# Patient Record
Sex: FEMALE | Race: WHITE | URBAN - METROPOLITAN AREA
[De-identification: names, ages, dates, MRNs, and addresses within clinical notes are randomized per-mention and may not be internally consistent; named-entity substitution may affect disease eponyms.]

---

## 2017-11-13 DIAGNOSIS — N85.02 COMPLEX ENDOMETRIAL HYPERPLASIA WITH ATYPIA: Primary | ICD-10-CM

## 2017-11-27 RX ORDER — DIMENHYDRINATE 50 MG
1 TABLET ORAL DAILY
COMMUNITY

## 2017-11-27 RX ORDER — MULTIPLE VITAMINS W/ MINERALS TAB 9MG-400MCG
1 TAB ORAL DAILY
COMMUNITY

## 2017-11-28 ENCOUNTER — ANESTHESIA EVENT (OUTPATIENT)
Dept: SURGERY | Facility: CLINIC | Age: 57
End: 2017-11-28
Payer: COMMERCIAL

## 2017-11-29 ENCOUNTER — HOSPITAL ENCOUNTER (OUTPATIENT)
Facility: CLINIC | Age: 57
LOS: 1 days | Discharge: HOME | End: 2017-11-30
Attending: OBSTETRICS & GYNECOLOGY | Admitting: OBSTETRICS & GYNECOLOGY
Payer: COMMERCIAL

## 2017-11-29 ENCOUNTER — ANESTHESIA (OUTPATIENT)
Dept: SURGERY | Facility: CLINIC | Age: 57
End: 2017-11-29
Payer: COMMERCIAL

## 2017-11-29 ENCOUNTER — SURGERY (OUTPATIENT)
Age: 57
End: 2017-11-29
Payer: COMMERCIAL

## 2017-11-29 DIAGNOSIS — N85.02 COMPLEX ENDOMETRIAL HYPERPLASIA WITH ATYPIA: ICD-10-CM

## 2017-11-29 PROBLEM — Z90.710 S/P HYSTERECTOMY: Status: ACTIVE | Noted: 2017-11-29

## 2017-11-29 LAB
ABO + RH BLD: NORMAL
ABO + RH BLD: NORMAL
BLD GP AB SCN SERPL QL: NORMAL
BLOOD BANK CMNT PATIENT-IMP: NORMAL
GLUCOSE BLDC GLUCOMTR-MCNC: 100 MG/DL (ref 70–99)
HGB BLD-MCNC: 15.3 G/DL (ref 11.7–15.7)
POTASSIUM SERPL-SCNC: 3.9 MMOL/L (ref 3.4–5.3)
SPECIMEN EXP DATE BLD: NORMAL

## 2017-11-29 PROCEDURE — C9399 UNCLASSIFIED DRUGS OR BIOLOG: HCPCS | Performed by: NURSE ANESTHETIST, CERTIFIED REGISTERED

## 2017-11-29 PROCEDURE — 25000128 H RX IP 250 OP 636: Performed by: OBSTETRICS & GYNECOLOGY

## 2017-11-29 PROCEDURE — 40000171 ZZH STATISTIC PRE-PROCEDURE ASSESSMENT III: Performed by: OBSTETRICS & GYNECOLOGY

## 2017-11-29 PROCEDURE — 88342 IMHCHEM/IMCYTCHM 1ST ANTB: CPT | Performed by: OBSTETRICS & GYNECOLOGY

## 2017-11-29 PROCEDURE — 36415 COLL VENOUS BLD VENIPUNCTURE: CPT | Performed by: OBSTETRICS & GYNECOLOGY

## 2017-11-29 PROCEDURE — 27210794 ZZH OR GENERAL SUPPLY STERILE: Performed by: OBSTETRICS & GYNECOLOGY

## 2017-11-29 PROCEDURE — C9290 INJ, BUPIVACAINE LIPOSOME: HCPCS | Performed by: STUDENT IN AN ORGANIZED HEALTH CARE EDUCATION/TRAINING PROGRAM

## 2017-11-29 PROCEDURE — 80048 BASIC METABOLIC PNL TOTAL CA: CPT | Performed by: NURSE PRACTITIONER

## 2017-11-29 PROCEDURE — 85018 HEMOGLOBIN: CPT | Performed by: OBSTETRICS & GYNECOLOGY

## 2017-11-29 PROCEDURE — 82962 GLUCOSE BLOOD TEST: CPT

## 2017-11-29 PROCEDURE — 86900 BLOOD TYPING SEROLOGIC ABO: CPT | Performed by: NURSE ANESTHETIST, CERTIFIED REGISTERED

## 2017-11-29 PROCEDURE — 88341 IMHCHEM/IMCYTCHM EA ADD ANTB: CPT | Performed by: OBSTETRICS & GYNECOLOGY

## 2017-11-29 PROCEDURE — 25000128 H RX IP 250 OP 636: Performed by: STUDENT IN AN ORGANIZED HEALTH CARE EDUCATION/TRAINING PROGRAM

## 2017-11-29 PROCEDURE — 25000128 H RX IP 250 OP 636: Performed by: NURSE ANESTHETIST, CERTIFIED REGISTERED

## 2017-11-29 PROCEDURE — 36000088 ZZH SURGERY LEVEL 8 EA 15 ADDTL MIN - UMMC: Performed by: OBSTETRICS & GYNECOLOGY

## 2017-11-29 PROCEDURE — 25000566 ZZH SEVOFLURANE, EA 15 MIN: Performed by: OBSTETRICS & GYNECOLOGY

## 2017-11-29 PROCEDURE — 88307 TISSUE EXAM BY PATHOLOGIST: CPT | Performed by: OBSTETRICS & GYNECOLOGY

## 2017-11-29 PROCEDURE — 25000125 ZZHC RX 250: Performed by: STUDENT IN AN ORGANIZED HEALTH CARE EDUCATION/TRAINING PROGRAM

## 2017-11-29 PROCEDURE — S0020 INJECTION, BUPIVICAINE HYDRO: HCPCS | Performed by: STUDENT IN AN ORGANIZED HEALTH CARE EDUCATION/TRAINING PROGRAM

## 2017-11-29 PROCEDURE — 25000128 H RX IP 250 OP 636: Performed by: ANESTHESIOLOGY

## 2017-11-29 PROCEDURE — 58573 TLH W/T/O UTERUS OVER 250 G: CPT | Mod: GC | Performed by: OBSTETRICS & GYNECOLOGY

## 2017-11-29 PROCEDURE — 88331 PATH CONSLTJ SURG 1 BLK 1SPC: CPT | Performed by: OBSTETRICS & GYNECOLOGY

## 2017-11-29 PROCEDURE — 71000015 ZZH RECOVERY PHASE 1 LEVEL 2 EA ADDTL HR: Performed by: OBSTETRICS & GYNECOLOGY

## 2017-11-29 PROCEDURE — 36000086 ZZH SURGERY LEVEL 8 1ST 30 MIN UMMC: Performed by: OBSTETRICS & GYNECOLOGY

## 2017-11-29 PROCEDURE — 37000009 ZZH ANESTHESIA TECHNICAL FEE, EACH ADDTL 15 MIN: Performed by: OBSTETRICS & GYNECOLOGY

## 2017-11-29 PROCEDURE — 86901 BLOOD TYPING SEROLOGIC RH(D): CPT | Performed by: NURSE ANESTHETIST, CERTIFIED REGISTERED

## 2017-11-29 PROCEDURE — 88309 TISSUE EXAM BY PATHOLOGIST: CPT | Performed by: OBSTETRICS & GYNECOLOGY

## 2017-11-29 PROCEDURE — 38571 LAPAROSCOPY LYMPHADENECTOMY: CPT | Mod: GC | Performed by: OBSTETRICS & GYNECOLOGY

## 2017-11-29 PROCEDURE — 84132 ASSAY OF SERUM POTASSIUM: CPT | Performed by: OBSTETRICS & GYNECOLOGY

## 2017-11-29 PROCEDURE — 86850 RBC ANTIBODY SCREEN: CPT | Performed by: NURSE ANESTHETIST, CERTIFIED REGISTERED

## 2017-11-29 PROCEDURE — 25000125 ZZHC RX 250: Performed by: OBSTETRICS & GYNECOLOGY

## 2017-11-29 PROCEDURE — 37000008 ZZH ANESTHESIA TECHNICAL FEE, 1ST 30 MIN: Performed by: OBSTETRICS & GYNECOLOGY

## 2017-11-29 PROCEDURE — 71000014 ZZH RECOVERY PHASE 1 LEVEL 2 FIRST HR: Performed by: OBSTETRICS & GYNECOLOGY

## 2017-11-29 RX ORDER — CEFAZOLIN SODIUM 2 G/100ML
2 INJECTION, SOLUTION INTRAVENOUS
Status: DISCONTINUED | OUTPATIENT
Start: 2017-11-29 | End: 2017-11-29 | Stop reason: DRUGHIGH

## 2017-11-29 RX ORDER — ONDANSETRON 2 MG/ML
4 INJECTION INTRAMUSCULAR; INTRAVENOUS EVERY 30 MIN PRN
Status: DISCONTINUED | OUTPATIENT
Start: 2017-11-29 | End: 2017-11-29 | Stop reason: HOSPADM

## 2017-11-29 RX ORDER — CEFAZOLIN SODIUM 1 G/50ML
3 SOLUTION INTRAVENOUS
Status: COMPLETED | OUTPATIENT
Start: 2017-11-29 | End: 2017-11-29

## 2017-11-29 RX ORDER — DEXAMETHASONE SODIUM PHOSPHATE 4 MG/ML
INJECTION, SOLUTION INTRA-ARTICULAR; INTRALESIONAL; INTRAMUSCULAR; INTRAVENOUS; SOFT TISSUE PRN
Status: DISCONTINUED | OUTPATIENT
Start: 2017-11-29 | End: 2017-11-29

## 2017-11-29 RX ORDER — OXYCODONE HYDROCHLORIDE 5 MG/1
5-10 TABLET ORAL
Qty: 20 TABLET | Refills: 0 | Status: SHIPPED | OUTPATIENT
Start: 2017-11-29

## 2017-11-29 RX ORDER — BUPIVACAINE HYDROCHLORIDE 2.5 MG/ML
INJECTION, SOLUTION EPIDURAL; INFILTRATION; INTRACAUDAL PRN
Status: DISCONTINUED | OUTPATIENT
Start: 2017-11-29 | End: 2017-11-29

## 2017-11-29 RX ORDER — ONDANSETRON 2 MG/ML
INJECTION INTRAMUSCULAR; INTRAVENOUS PRN
Status: DISCONTINUED | OUTPATIENT
Start: 2017-11-29 | End: 2017-11-29

## 2017-11-29 RX ORDER — OXYCODONE HYDROCHLORIDE 5 MG/1
5 TABLET ORAL
Status: DISCONTINUED | OUTPATIENT
Start: 2017-11-29 | End: 2017-11-30 | Stop reason: HOSPADM

## 2017-11-29 RX ORDER — KETOROLAC TROMETHAMINE 30 MG/ML
INJECTION, SOLUTION INTRAMUSCULAR; INTRAVENOUS PRN
Status: DISCONTINUED | OUTPATIENT
Start: 2017-11-29 | End: 2017-11-29

## 2017-11-29 RX ORDER — SODIUM CHLORIDE, SODIUM LACTATE, POTASSIUM CHLORIDE, CALCIUM CHLORIDE 600; 310; 30; 20 MG/100ML; MG/100ML; MG/100ML; MG/100ML
INJECTION, SOLUTION INTRAVENOUS CONTINUOUS
Status: DISCONTINUED | OUTPATIENT
Start: 2017-11-29 | End: 2017-11-29 | Stop reason: HOSPADM

## 2017-11-29 RX ORDER — ONDANSETRON 2 MG/ML
4 INJECTION INTRAMUSCULAR; INTRAVENOUS EVERY 6 HOURS PRN
Status: DISCONTINUED | OUTPATIENT
Start: 2017-11-29 | End: 2017-11-30 | Stop reason: HOSPADM

## 2017-11-29 RX ORDER — LISINOPRIL 40 MG/1
40 TABLET ORAL DAILY
Status: DISCONTINUED | OUTPATIENT
Start: 2017-11-30 | End: 2017-11-29

## 2017-11-29 RX ORDER — HYDROMORPHONE HYDROCHLORIDE 1 MG/ML
.3-.5 INJECTION, SOLUTION INTRAMUSCULAR; INTRAVENOUS; SUBCUTANEOUS EVERY 5 MIN PRN
Status: DISCONTINUED | OUTPATIENT
Start: 2017-11-29 | End: 2017-11-29 | Stop reason: HOSPADM

## 2017-11-29 RX ORDER — FENTANYL CITRATE 50 UG/ML
25-50 INJECTION, SOLUTION INTRAMUSCULAR; INTRAVENOUS
Status: DISCONTINUED | OUTPATIENT
Start: 2017-11-29 | End: 2017-11-29 | Stop reason: HOSPADM

## 2017-11-29 RX ORDER — ACETAMINOPHEN 325 MG/1
650 TABLET ORAL EVERY 6 HOURS PRN
Status: DISCONTINUED | OUTPATIENT
Start: 2017-11-29 | End: 2017-11-30 | Stop reason: HOSPADM

## 2017-11-29 RX ORDER — NALOXONE HYDROCHLORIDE 0.4 MG/ML
.1-.4 INJECTION, SOLUTION INTRAMUSCULAR; INTRAVENOUS; SUBCUTANEOUS
Status: DISCONTINUED | OUTPATIENT
Start: 2017-11-29 | End: 2017-11-30 | Stop reason: HOSPADM

## 2017-11-29 RX ORDER — INDOCYANINE GREEN AND WATER 25 MG
KIT INJECTION PRN
Status: DISCONTINUED | OUTPATIENT
Start: 2017-11-29 | End: 2017-11-29 | Stop reason: HOSPADM

## 2017-11-29 RX ORDER — FENTANYL CITRATE 50 UG/ML
INJECTION, SOLUTION INTRAMUSCULAR; INTRAVENOUS PRN
Status: DISCONTINUED | OUTPATIENT
Start: 2017-11-29 | End: 2017-11-29

## 2017-11-29 RX ORDER — ACETAMINOPHEN 325 MG/1
650 TABLET ORAL EVERY 4 HOURS PRN
Qty: 40 TABLET | Refills: 0 | Status: SHIPPED | OUTPATIENT
Start: 2017-11-29

## 2017-11-29 RX ORDER — LISINOPRIL 40 MG/1
40 TABLET ORAL DAILY
Status: DISCONTINUED | OUTPATIENT
Start: 2017-11-30 | End: 2017-11-30 | Stop reason: HOSPADM

## 2017-11-29 RX ORDER — AMOXICILLIN 250 MG
1-2 CAPSULE ORAL 2 TIMES DAILY
Qty: 30 TABLET | Refills: 0 | Status: SHIPPED | OUTPATIENT
Start: 2017-11-29

## 2017-11-29 RX ORDER — IBUPROFEN 600 MG/1
600 TABLET, FILM COATED ORAL EVERY 6 HOURS PRN
Status: DISCONTINUED | OUTPATIENT
Start: 2017-11-29 | End: 2017-11-30 | Stop reason: HOSPADM

## 2017-11-29 RX ORDER — PROPOFOL 10 MG/ML
INJECTION, EMULSION INTRAVENOUS PRN
Status: DISCONTINUED | OUTPATIENT
Start: 2017-11-29 | End: 2017-11-29

## 2017-11-29 RX ORDER — IBUPROFEN 600 MG/1
600 TABLET, FILM COATED ORAL EVERY 6 HOURS PRN
Qty: 30 TABLET | Refills: 0 | Status: SHIPPED | OUTPATIENT
Start: 2017-11-29

## 2017-11-29 RX ORDER — NALOXONE HYDROCHLORIDE 0.4 MG/ML
.1-.4 INJECTION, SOLUTION INTRAMUSCULAR; INTRAVENOUS; SUBCUTANEOUS
Status: DISCONTINUED | OUTPATIENT
Start: 2017-11-29 | End: 2017-11-29 | Stop reason: HOSPADM

## 2017-11-29 RX ORDER — LABETALOL HYDROCHLORIDE 5 MG/ML
10 INJECTION, SOLUTION INTRAVENOUS
Status: DISCONTINUED | OUTPATIENT
Start: 2017-11-29 | End: 2017-11-29 | Stop reason: HOSPADM

## 2017-11-29 RX ORDER — CEFAZOLIN SODIUM 1 G/3ML
1 INJECTION, POWDER, FOR SOLUTION INTRAMUSCULAR; INTRAVENOUS SEE ADMIN INSTRUCTIONS
Status: DISCONTINUED | OUTPATIENT
Start: 2017-11-29 | End: 2017-11-29 | Stop reason: HOSPADM

## 2017-11-29 RX ORDER — FLUMAZENIL 0.1 MG/ML
0.2 INJECTION, SOLUTION INTRAVENOUS
Status: DISCONTINUED | OUTPATIENT
Start: 2017-11-29 | End: 2017-11-29 | Stop reason: HOSPADM

## 2017-11-29 RX ORDER — AMLODIPINE BESYLATE 5 MG/1
5 TABLET ORAL DAILY
Status: DISCONTINUED | OUTPATIENT
Start: 2017-11-30 | End: 2017-11-30 | Stop reason: HOSPADM

## 2017-11-29 RX ORDER — ONDANSETRON 4 MG/1
4 TABLET, ORALLY DISINTEGRATING ORAL EVERY 6 HOURS PRN
Status: DISCONTINUED | OUTPATIENT
Start: 2017-11-29 | End: 2017-11-30 | Stop reason: HOSPADM

## 2017-11-29 RX ORDER — LIDOCAINE 40 MG/G
CREAM TOPICAL
Status: DISCONTINUED | OUTPATIENT
Start: 2017-11-29 | End: 2017-11-29 | Stop reason: HOSPADM

## 2017-11-29 RX ORDER — ONDANSETRON 4 MG/1
4 TABLET, ORALLY DISINTEGRATING ORAL EVERY 30 MIN PRN
Status: DISCONTINUED | OUTPATIENT
Start: 2017-11-29 | End: 2017-11-29 | Stop reason: HOSPADM

## 2017-11-29 RX ORDER — HYDROCHLOROTHIAZIDE 25 MG/1
25 TABLET ORAL DAILY
Status: DISCONTINUED | OUTPATIENT
Start: 2017-11-30 | End: 2017-11-30 | Stop reason: HOSPADM

## 2017-11-29 RX ADMIN — SUGAMMADEX 200 MG: 100 INJECTION, SOLUTION INTRAVENOUS at 16:23

## 2017-11-29 RX ADMIN — FENTANYL CITRATE 50 MCG: 50 INJECTION, SOLUTION INTRAMUSCULAR; INTRAVENOUS at 14:45

## 2017-11-29 RX ADMIN — FENTANYL CITRATE 50 MCG: 50 INJECTION, SOLUTION INTRAMUSCULAR; INTRAVENOUS at 16:27

## 2017-11-29 RX ADMIN — ROCURONIUM BROMIDE 10 MG: 10 INJECTION INTRAVENOUS at 15:03

## 2017-11-29 RX ADMIN — ONDANSETRON 4 MG: 2 INJECTION INTRAMUSCULAR; INTRAVENOUS at 16:20

## 2017-11-29 RX ADMIN — BUPIVACAINE 20 ML: 13.3 INJECTION, SUSPENSION, LIPOSOMAL INFILTRATION at 11:58

## 2017-11-29 RX ADMIN — ROCURONIUM BROMIDE 30 MG: 10 INJECTION INTRAVENOUS at 15:35

## 2017-11-29 RX ADMIN — ROCURONIUM BROMIDE 80 MG: 10 INJECTION INTRAVENOUS at 12:56

## 2017-11-29 RX ADMIN — KETOROLAC TROMETHAMINE 30 MG: 30 INJECTION, SOLUTION INTRAMUSCULAR at 16:24

## 2017-11-29 RX ADMIN — FENTANYL CITRATE 50 MCG: 50 INJECTION, SOLUTION INTRAMUSCULAR; INTRAVENOUS at 12:03

## 2017-11-29 RX ADMIN — Medication 0.3 MG: at 17:38

## 2017-11-29 RX ADMIN — FENTANYL CITRATE 50 MCG: 50 INJECTION, SOLUTION INTRAMUSCULAR; INTRAVENOUS at 13:48

## 2017-11-29 RX ADMIN — PROPOFOL 170 MG: 10 INJECTION, EMULSION INTRAVENOUS at 12:56

## 2017-11-29 RX ADMIN — DEXAMETHASONE SODIUM PHOSPHATE 8 MG: 4 INJECTION, SOLUTION INTRA-ARTICULAR; INTRALESIONAL; INTRAMUSCULAR; INTRAVENOUS; SOFT TISSUE at 13:11

## 2017-11-29 RX ADMIN — CEFAZOLIN 1 G: 1 INJECTION, POWDER, FOR SOLUTION INTRAMUSCULAR; INTRAVENOUS at 15:00

## 2017-11-29 RX ADMIN — ROCURONIUM BROMIDE 20 MG: 10 INJECTION INTRAVENOUS at 14:04

## 2017-11-29 RX ADMIN — Medication 3 G: at 13:08

## 2017-11-29 RX ADMIN — ROCURONIUM BROMIDE 20 MG: 10 INJECTION INTRAVENOUS at 14:31

## 2017-11-29 RX ADMIN — INDOCYANINE GREEN 5 MG: KIT INTRAVENOUS at 13:48

## 2017-11-29 RX ADMIN — FENTANYL CITRATE 25 MCG: 50 INJECTION, SOLUTION INTRAMUSCULAR; INTRAVENOUS at 17:35

## 2017-11-29 RX ADMIN — BUPIVACAINE HYDROCHLORIDE 20 ML: 2.5 INJECTION, SOLUTION EPIDURAL; INFILTRATION; INTRACAUDAL at 11:58

## 2017-11-29 RX ADMIN — ROCURONIUM BROMIDE 20 MG: 10 INJECTION INTRAVENOUS at 13:38

## 2017-11-29 RX ADMIN — MIDAZOLAM HYDROCHLORIDE 1 MG: 1 INJECTION, SOLUTION INTRAMUSCULAR; INTRAVENOUS at 12:44

## 2017-11-29 RX ADMIN — FENTANYL CITRATE 100 MCG: 50 INJECTION, SOLUTION INTRAMUSCULAR; INTRAVENOUS at 12:56

## 2017-11-29 RX ADMIN — SODIUM CHLORIDE, POTASSIUM CHLORIDE, SODIUM LACTATE AND CALCIUM CHLORIDE: 600; 310; 30; 20 INJECTION, SOLUTION INTRAVENOUS at 12:43

## 2017-11-29 RX ADMIN — SODIUM CHLORIDE, POTASSIUM CHLORIDE, SODIUM LACTATE AND CALCIUM CHLORIDE: 600; 310; 30; 20 INJECTION, SOLUTION INTRAVENOUS at 15:30

## 2017-11-29 RX ADMIN — MIDAZOLAM HYDROCHLORIDE 1 MG: 1 INJECTION, SOLUTION INTRAMUSCULAR; INTRAVENOUS at 12:42

## 2017-11-29 ASSESSMENT — PAIN DESCRIPTION - DESCRIPTORS: DESCRIPTORS: SORE

## 2017-11-29 ASSESSMENT — ENCOUNTER SYMPTOMS: SEIZURES: 0

## 2017-11-29 NOTE — IP AVS SNAPSHOT
MRN:[id number]                      After Visit Summary   11/29/2017    Priyanka Cordero    MRN: [id number]           Thank you!     Thank you for choosing [facility] for your care. Our goal is always to provide you with excellent care. Hearing back from our patients is one way we can continue to improve our services. Please take a few minutes to complete the written survey that you may receive in the mail after you visit with us. Thank you!        Patient Information     Date Of Birth          1960        Designated Caregiver       Most Recent Value    Caregiver    Will someone help with your care after discharge? yes    Name of designated caregiver Pat    Phone number of caregiver Home    Caregiver address home      About your hospital stay     You were admitted on:  November 29, 2017 You last received care in the:  [address] [facility] [place]    You were discharged on:  November 30, 2017        Reason for your hospital stay       Surgery                  Who to Call     For medical emergencies, please call 911.  For non-urgent questions about your medical care, please call your primary care provider or clinic, [phone]  For questions related to your surgery, please call your surgery clinic        Attending Provider     Provider Specialty    Tabitha Pichardo MD Gyn-Onc    Brittney Olsen MD Gyn-Onc       Primary Care Provider Office Phone # Fax #    Ayaz Carrenohulz [phone]       When to contact your care team       GENERAL POST-OPERATIVE  PATIENT INSTRUCTIONS      FOLLOW-UP:    Call Surgeon if you have:  Temperature greater than 100.4  Persistent nausea and vomiting  Severe uncontrolled pain  Redness, tenderness, or signs of infection (pain, swelling, redness, odor or green/yellow discharge around the site)  Difficulty breathing, headache or visual disturbances  Hives  Persistent dizziness or light-headedness  Extreme fatigue  Any other questions or concerns  you may have after discharge    In an emergency, call 911 or go to an Emergency Department at a nearby hospital       WOUND CARE INSTRUCTIONS:  Keep a dry clean dressing on the wound if there is drainage. If you had a bandage initially, it may be removed after 24 hours.  Once the wound has quit draining you may leave it open to air.  If clothing rubs against the wound or causes irritation and the wound is not draining you may cover it with a dry dressing during the daytime.  Try to keep the wound dry and avoid ointments on the wound unless directed to do so.  If the wound becomes bright red and painful or starts to drain infected material that is not clear, please contact your physician immediately.    1.  You may shower 24 hrs after surgery   2.  No soaking in the tub for 4 weeks       DIET:  There are no dietary restrictions.  You may eat any foods that you can tolerate unless instructed otherwise.  It is a good idea to eat a high fiber diet and take in plenty of fluids to prevent constipation.  If you become constipated, please follow the instructions below.    ACTIVITY:  You are encouraged to cough, deep breath and use your incentive spirometer if you were given one, every 15-30 minutes when awake.  This will help prevent respiratory complications and low grade fevers post-operatively.  You may want to hug a pillow when coughing and sneezing to add additional support to the surgical area, if you had abdominal surgery, which will decrease pain during these times.      1.  No heavy lifting >20lbs or strenuous exercise for six-eight weeks.  No exercise in which you are using core muscles (yoga, pilates, swimming, weight lifting)  2.  You may walk as much as you wish.  You are encouraged to increase your activity each day after surgery.  Stairs are okay.   3.  Nothing per vagina for eight weeks.  No tampons, no intercourse, no douching.  You can expect some light vaginal spotting and discharge for up to six weeks.   If bleeding becomes heavy, please contact the office.     MEDICATIONS:  Try to take narcotic medications and anti-inflammatory medications, such as tylenol, ibuprofen, naprosyn, etc., with food.  This will minimize stomach upset from the medication.  Should you develop nausea and vomiting from the pain medication, or develop a rash, please discontinue the medication and contact your physician.  You should not drive, make important decisions, or operate machinery when taking narcotic pain medication.    OTHER:  Patients are often constipated after general anesthesia and surgery.  The patient should continue to take stool softeners (for example, Senokot-S) for the next six weeks (unless diarrhea develops) and consume adequate amounts of water.  If the patient remains constipated or unable to pass stool, please try one or all of the following measures:  1.  Milk of Magnesia 30cc twice a day as needed by mouth  2.  Metamucil 2 tablespoons in 12 ounces of fluid  3.  Dulcolax oral or suppositories  4.  Prunes or prune juice  5.  Miralax daily      QUESTIONS:  Please feel free to call your physician or the hospital  if you have any questions, and they will be glad to assist you.                  After Care Instructions     Activity       Your activity upon discharge: activity as tolerated            Diet       Follow this diet upon discharge:       Regular Diet Adult            Diet Instructions       Resume pre-procedure diet            Do not - immerse incision in water until sutures removed       Do not immerse incision in water until sutures removed            Dressing       Keep dressing clean and dry.  Dressing / incisional care as instructed by RN and or Surgeon            No Alcohol       For 24 hours post procedure            No driving or operating machinery        until the day after procedure            No lifting        No lifting over 20 lbs and no strenuous physical activity for 8 weeks             "Shower       No shower for 24 hours post procedure. May shower Postoperative Day (POD)  1                  Follow-up Appointments     Adult [facility] Follow-up and recommended labs and tests       Follow up at [facility] as instructed. Please call [phone] for questions or concerns    Appointments on [place] and/or [facility] (with [facility] or [facility] provider or service). Call [phone] if you haven't heard regarding these appointments within 7 days of discharge.                  Additional Information     If you use hormonal birth control (such as the pill, patch, ring or implants): You'll need a second form of birth control for 7 days (condoms, a diaphragm or contraceptive foam). While in the hospital, you received a medicine called Bridion. Your normal birth control will not work as well for a week after taking this medicine.          Pending Results     Date and Time Order Name Status Description    11/29/2017 1458 Surgical pathology exam Preliminary             Statement of Approval     Ordered          11/30/17 0909  I have reviewed and agree with all the recommendations and orders detailed in this document.  EFFECTIVE NOW     Approved and electronically signed by:  Iesha Reddy APRN CNP             Admission Information     Date & Time Provider Department Dept. Phone    11/29/2017 Brittney Olsen MD [address] [facility] [place] [phone]      Your Vitals Were     Blood Pressure Pulse Temperature Respirations Height Weight    143/66 97 97.9  F (36.6  C) (Oral) 21 1.676 m (5' 6\") 126 kg (277 lb 12.5 oz)    Pulse Oximetry BMI (Body Mass Index)                93% 44.83 kg/m2          [employer] Information     [employer] lets you send messages to your doctor, view your test results, renew your prescriptions, schedule appointments and more. To sign up, go to [email or web address] . Click on \"Log in\" on the left side of the screen, which will take you to the Welcome page. Then click on " "\"Sign up Now\" on the right side of the page.     You will be asked to enter the access code listed below, as well as some personal information. Please follow the directions to create your username and password.     Your access code is: [email or web address]  Expires: 2018  9:44 AM     Your access code will  in 90 days. If you need help or a new code, please call your [facility] clinic or [phone].        Care EveryWhere ID     This is your Care EveryWhere ID. This could be used by other organizations to access your [facility] medical records  [id number]        Equal Access to Services     [facility]: Obinna Lara, jose galloway, juvenal hines, elizabeth braun . So [facility] [phone].    ATENCIÓN: Si habla español, tiene a parnell disposición servicios gratuitos de asistencia lingüística. Llame al [phone].    We comply with applicable federal civil rights laws and [place] laws. We do not discriminate on the basis of race, color, national origin, age, disability, sex, sexual orientation, or gender identity.               Review of your medicines      START taking        Dose / Directions    acetaminophen 325 MG tablet   Commonly known as:  TYLENOL   Used for:  Complex endometrial hyperplasia with atypia        Dose:  650 mg   Take 2 tablets (650 mg) by mouth every 4 hours as needed for other (mild pain)   Quantity:  40 tablet   Refills:  0       enoxaparin 40 MG/0.4ML injection   Commonly known as:  LOVENOX   Used for:  Complex endometrial hyperplasia with atypia        Dose:  40 mg   Inject 0.4 mLs (40 mg) Subcutaneous 2 times daily for 14 days   Quantity:  11.2 mL   Refills:  0       ibuprofen 600 MG tablet   Commonly known as:  ADVIL/MOTRIN   Used for:  Complex endometrial hyperplasia with atypia        Dose:  600 mg   Take 1 tablet (600 mg) by mouth every 6 hours as needed for pain (mild)   Quantity:  30 tablet   Refills:  0       oxyCODONE IR 5 MG " tablet   Commonly known as:  ROXICODONE   Used for:  Complex endometrial hyperplasia with atypia        Dose:  5-10 mg   Take 1-2 tablets (5-10 mg) by mouth every 3 hours as needed for pain or other (Moderate to Severe)   Quantity:  20 tablet   Refills:  0       senna-docusate 8.6-50 MG per tablet   Commonly known as:  SENOKOT-S;PERICOLACE   Used for:  Complex endometrial hyperplasia with atypia        Dose:  1-2 tablet   Take 1-2 tablets by mouth 2 times daily Take while on oral narcotics to prevent or treat constipation.   Quantity:  30 tablet   Refills:  0         CONTINUE these medicines which have NOT CHANGED        Dose / Directions    ASPIRIN PO        Dose:  81 mg   Take 81 mg by mouth daily   Refills:  0       flax seed oil 1000 MG capsule        Dose:  1 capsule   Take 1 capsule by mouth daily   Refills:  0       HYDROCHLOROTHIAZIDE PO   Indication:  High Blood Pressure Disorder        Dose:  25 mg   Take 25 mg by mouth daily   Refills:  0       LISINOPRIL PO        Dose:  40 mg   Take 40 mg by mouth   Refills:  0       Multi-vitamin Tabs tablet        Dose:  1 tablet   Take 1 tablet by mouth daily   Refills:  0       NORVASC PO        Dose:  5 mg   Take 5 mg by mouth   Refills:  0            Where to get your medicines      These medications were sent to [facility] [place], [place] - [address] [address]     Phone:  [phone]     acetaminophen 325 MG tablet    enoxaparin 40 MG/0.4ML injection    ibuprofen 600 MG tablet    senna-docusate 8.6-50 MG per tablet         Some of these will need a paper prescription and others can be bought over the counter. Ask your nurse if you have questions.     Bring a paper prescription for each of these medications     oxyCODONE IR 5 MG tablet                Protect others around you: Learn how to safely use, store and throw away your medicines at www.disposemymeds.org.             Medication List: This is a  list of all your medications and when to take them. Check marks below indicate your daily home schedule. Keep this list as a reference.      Medications           Morning Afternoon Evening Bedtime As Needed    acetaminophen 325 MG tablet   Commonly known as:  TYLENOL   Take 2 tablets (650 mg) by mouth every 4 hours as needed for other (mild pain)                                ASPIRIN PO   Take 81 mg by mouth daily                                enoxaparin 40 MG/0.4ML injection   Commonly known as:  LOVENOX   Inject 0.4 mLs (40 mg) Subcutaneous 2 times daily for 14 days   Last time this was given:  40 mg on 11/30/2017  9:52 AM                                flax seed oil 1000 MG capsule   Take 1 capsule by mouth daily                                HYDROCHLOROTHIAZIDE PO   Take 25 mg by mouth daily   Last time this was given:  25 mg on 11/30/2017  8:59 AM                                ibuprofen 600 MG tablet   Commonly known as:  ADVIL/MOTRIN   Take 1 tablet (600 mg) by mouth every 6 hours as needed for pain (mild)                                LISINOPRIL PO   Take 40 mg by mouth   Last time this was given:  40 mg on 11/30/2017  8:59 AM                                Multi-vitamin Tabs tablet   Take 1 tablet by mouth daily                                NORVASC PO   Take 5 mg by mouth   Last time this was given:  5 mg on 11/30/2017  8:59 AM                                oxyCODONE IR 5 MG tablet   Commonly known as:  ROXICODONE   Take 1-2 tablets (5-10 mg) by mouth every 3 hours as needed for pain or other (Moderate to Severe)                                senna-docusate 8.6-50 MG per tablet   Commonly known as:  SENOKOT-S;PERICOLACE   Take 1-2 tablets by mouth 2 times daily Take while on oral narcotics to prevent or treat constipation.

## 2017-11-29 NOTE — BRIEF OP NOTE
[facility] Gynecology Brief Operative Note    Pre-operative diagnosis: Complex Endometrial Hyperplasia With Atypia    Post-operative diagnosis: Grade 1 Endometrial cancer, Stage 1A   Procedure: Total laparoscopic hysterectomy, bilateral salpingo oophorectomy, pelvic lymph node dissection and repair of vaginal laceration   Surgeon: Tabitha Pichardo MD   Assistant(s): Denny Bach MD, GYN ONC Fellow, Brianna Oviedo, PGY4,    Anesthesia: GETA and TAP Blocks   Estimated blood loss: 50 cc   Total IV fluids: 1400 cc crystalloid   Total urine output: 250 cc   Fluid Deficit: none   Specimens: None   Findings: EUA: large, anteverted uterus, large, mobile cervix. Intra op: no injury at site of entry. Normal liver edge with likely fatty nodules on liver. Normal diaphragm. Bilateral ovaries with small cysts. Normal fallopian tubes and large uterus   Complications: None   Condition: Stable   Comments: See dictated operative report for full details     Brianna Oviedo   G4 OB-GYN Resident  November 29, 2017

## 2017-11-29 NOTE — IP AVS SNAPSHOT
[address] [address]    Phone:  [phone]                                       After Visit Summary   11/29/2017    Priyanka Cordero    MRN: [id number]           After Visit Summary Signature Page     I have received my discharge instructions, and my questions have been answered. I have discussed any challenges I see with this plan with the nurse or doctor.    ..........................................................................................................................................  Patient/Patient Representative Signature      ..........................................................................................................................................  Patient Representative Print Name and Relationship to Patient    ..................................................               ................................................  Date                                            Time    ..........................................................................................................................................  Reviewed by Signature/Title    ...................................................              ..............................................  Date                                                            Time

## 2017-11-29 NOTE — ANESTHESIA CARE TRANSFER NOTE
Patient: Priyanka Cordero    Procedure(s):  DaVinci Assisted Total Laparoscopic Hysterectomy, Bilateral Salpingo-Oophorectomy, Pelvic Lymph, repair of vaginal laceration Nodes  Anesthesia Block - Wound Class: II-Clean Contaminated    Diagnosis: Complex Endometrial Hyperplasia With Atypia   Diagnosis Additional Information: No value filed.    Anesthesia Type:   General, ETT     Note:  Airway :Face Mask  Patient transferred to:PACU  Comments: Patient oxygenating and ventilating well on 6LFM.  Patient PUGA, following commands, and denies pain.  Report given to RN and VSS. Handoff Report: Identifed the Patient, Identified the Reponsible Provider, Reviewed the pertinent medical history, Discussed the surgical course, Reviewed Intra-OP anesthesia mangement and issues during anesthesia, Set expectations for post-procedure period and Allowed opportunity for questions and acknowledgement of understanding      Vitals: (Last set prior to Anesthesia Care Transfer)    CRNA VITALS  11/29/2017 1614 - 11/29/2017 1650      11/29/2017             NIBP: 140/70    Ht Rate: 85    SpO2: 98 %    Resp Rate (observed): 16    EKG: Sinus rhythm                Electronically Signed By: DIRK Gambino CRNA  November 29, 2017  4:50 PM

## 2017-11-29 NOTE — DISCHARGE SUMMARY
Gynecologic Oncology Discharge Summary    Priyanka Cordero  [id number]    Admit Date: 11/29/2017  Discharge Date: 11/30/2017  Admitting Provider: Brittney Ceballos MD  Discharge Provider: Dr Ceballos    Admission Dx:   - Complex atypical hyperplasia  - HTN    Discharge Dx:  -Endometrial carcinoma  - HTN    Patient Active Problem List   Diagnosis     S/P hysterectomy       Procedures: TLH BSO PLND    Prior to Admission Medications:  Prescriptions Prior to Admission   Medication Sig Dispense Refill Last Dose     LISINOPRIL PO Take 40 mg by mouth   11/29/2017 at 0700     AmLODIPine Besylate (NORVASC PO) Take 5 mg by mouth   11/29/2017 at 0700     ASPIRIN PO Take 81 mg by mouth daily   Past Week at Unknown time     Flaxseed, Linseed, (FLAX SEED OIL) 1000 MG capsule Take 1 capsule by mouth daily   Past Week at Unknown time     HYDROCHLOROTHIAZIDE PO Take 25 mg by mouth daily   11/29/2017 at 0700     multivitamin, therapeutic with minerals (MULTI-VITAMIN) TABS tablet Take 1 tablet by mouth daily   Past Week at Unknown time       Discharge Medications:   Priyanka Cordero   Home Medication Instructions MARIA DEL CARMEN:[id number]    Printed on:11/30/17 0911   Medication Information                      acetaminophen (TYLENOL) 325 MG tablet  Take 2 tablets (650 mg) by mouth every 4 hours as needed for other (mild pain)             AmLODIPine Besylate (NORVASC PO)  Take 5 mg by mouth             ASPIRIN PO  Take 81 mg by mouth daily             enoxaparin (LOVENOX) 40 MG/0.4ML injection  Inject 0.4 mLs (40 mg) Subcutaneous 2 times daily for 14 days             Flaxseed, Linseed, (FLAX SEED OIL) 1000 MG capsule  Take 1 capsule by mouth daily             HYDROCHLOROTHIAZIDE PO  Take 25 mg by mouth daily             ibuprofen (ADVIL/MOTRIN) 600 MG tablet  Take 1 tablet (600 mg) by mouth every 6 hours as needed for pain (mild)             LISINOPRIL PO  Take 40 mg by mouth             multivitamin, therapeutic with minerals (MULTI-VITAMIN)  TABS tablet  Take 1 tablet by mouth daily             oxyCODONE IR (ROXICODONE) 5 MG tablet  Take 1-2 tablets (5-10 mg) by mouth every 3 hours as needed for pain or other (Moderate to Severe)             senna-docusate (SENOKOT-S;PERICOLACE) 8.6-50 MG per tablet  Take 1-2 tablets by mouth 2 times daily Take while on oral narcotics to prevent or treat constipation.                 Consultations:  None    Brief History of Illness:  Priyanka Cordero is a 57 year old female who presented for surgical management of her Complex atypical hyperplasia. She was recommended a Total laparoscopic hysterectomy and bilateral salpingo oophorectomy possible LND    Hospital Course:  Dz:   - Preoperative diagnosis was CAH.  Frozen section at the time of the surgery showed Stage IA endometrial cancer.  Final pathology is pending at the time of discharge.  She will follow-up postoperatively for a care plan.  FEN:   -  By discharge, she was tolerating a regular diet without nausea and vomiting and able to maintain her hydration without IVF supplementation.  Pain:   - Her pain was initially controlled on a dilaudid PCA.  Once tolerating PO pain meds, she was transitioned to a PO pain regimen.  Her pain was well controlled on this and she was discharged home with these medications.  CV:   - She has a history of HTN. She will resume home meds.  Her vital signs were stable while in house and she had no acute CV issues.  PULM:   - She has no history of pulmonary issues.  She was initially given O2 supplementation in to maintain her O2 sats in the immediate postop period and was transitioned off of this without difficulty.  By discharge, her O2 sats were greater than 94% on RA.  She was encouraged to use her bedside IS while in house.  She had no acute pulmonary issues while in house.  HEME:   - Her preoperative Hgb was 15.3. She had no other acute heme issues while in house.  GI:   - She was made NPO prior to the procedure.  On POD#0, her  diet was advanced to clear liquids and then advanced slowly as tolerated.  At the time of discharge, she was tolerating a regular diet without nausea and vomiting.  She will be discharged with a bowel regimen to prevent constipation in the postoperative period.  She had no acute GI issues while in house.  :    - A aguiar catheter was placed at the time of the surgery and removed at the end of the surgical case. Prior to discharge, the patient was voiding spontaneously without difficulty.  She had no acute  issues while in house.  ID:   - The patient was AF during her hospitalization.  She received standard preoperative antibiotics without incident.    ENDO:   - No acute issues  PSYCH/NEURO:   - No acute issues  PPX:    - She was given SCDs, IS, PPI and lovenox during her hospital course.  She tolerated these prophylactic interventions without incident.        Discharge Instructions and Follow up:  Ms. Priyanka Cordero was discharged from the hospital with follow up for     Discharge Diet: Regular  Discharge Activity: Activity as tolerated  Discharge Follow up: 12/12/17 Dr Pichardo at [facility]    Discharge Disposition:  Discharged to home    Discharge Staff: Dr Tucker Oviedo MD 5:12 PM  Iesha Reddy CNP  11/30/2017 9:14 AM    Brittney Ceballos MD  Gynecologic Oncology  [facility]

## 2017-11-29 NOTE — ANESTHESIA PROCEDURE NOTES
Peripheral Nerve Block Procedure Note    Staff:     Anesthesiologist:  ZAY VIEIRA    Resident/CRNA:  ESVIN RIVERA    Block performed by resident/CRNA in the presence of a teaching physician    Location: Pre-op  Procedure Start/Stop TImes:      11/29/2017 11:51 AM     11/29/2017 12:01 PM    patient identified, IV checked, site marked, risks and benefits discussed, informed consent, monitors and equipment checked, pre-op evaluation, at physician/surgeon's request and post-op pain management      Correct Patient: Yes      Correct Position: Yes      Correct Site: Yes      Correct Procedure: Yes      Correct Laterality:  N/A    Site Marked:  Yes  Procedure details:     Procedure:  TAP    ASA:  3    Diagnosis:  Post operative pain    Laterality:  Bilateral    Position:  Supine    Sterile Prep: chloraprep, mask and sterile gloves      Local skin infiltration:  None    Needle:  Short bevel    Needle gauge:  21    Needle length (mm):  100    Ultrasound: Yes      Ultrasound used to identify targeted nerve, plexus, or vascular structure and placed a needle adjacent to it      Permanent Image entered into patiient's record      Abnormal pain on injection: No      Blood Aspirated: No      Paresthesias:  No    Bleeding at site: No      Bolus via:  Needle    Infusion Method:  Single Shot    Complications:  None  Assessment/Narrative:     Injection made incrementally with aspirations every (mL):  5     Informed consent obtained.  All risks and benefits of the nerve block discussed with the patient.  All questions answered and all parties agreed with the plan.   Block was placed at the surgeon's request for post operative pain control.

## 2017-11-29 NOTE — ANESTHESIA POSTPROCEDURE EVALUATION
Patient: Priyanka Cordero    Procedure(s):  DaVinci Assisted Total Laparoscopic Hysterectomy, Bilateral Salpingo-Oophorectomy, Pelvic Lymph, repair of vaginal laceration Nodes  Anesthesia Block - Wound Class: II-Clean Contaminated    Diagnosis:Complex Endometrial Hyperplasia With Atypia   Diagnosis Additional Information: No value filed.    Anesthesia Type:  General, ETT    Note:  Anesthesia Post Evaluation    Patient location during evaluation: PACU  Patient participation: Able to fully participate in evaluation  Level of consciousness: awake and alert  Pain management: satisfactory to patient  Airway patency: patent  Cardiovascular status: acceptable and stable  Respiratory status: acceptable and spontaneous ventilation  Hydration status: euvolemic  PONV: controlled     Anesthetic complications: None          Last vitals:  Vitals:    11/29/17 1150 11/29/17 1155 11/29/17 1200   BP:      Resp:   26   Temp:      SpO2: 100% 99% 99%         Electronically Signed By: Praneeth Valentino MD  November 29, 2017  5:00 PM

## 2017-11-29 NOTE — ANESTHESIA PREPROCEDURE EVALUATION
Anesthesia Evaluation     . Pt has had prior anesthetic. Type: General    No history of anesthetic complications          ROS/MED HX    ENT/Pulmonary:       Neurologic:  - neg neurologic ROS    (-) seizures and CVA   Cardiovascular:     (+) hypertension----. : . . . :. .       METS/Exercise Tolerance:     Hematologic: Comments: H/o PE in 2012, off coumadin x 2 yrs    (+) History of blood clots pt is not anticoagulated, -      Musculoskeletal:         GI/Hepatic:  - neg GI/hepatic ROS       Renal/Genitourinary:         Endo:     (+) Obesity, .      Psychiatric:         Infectious Disease:         Malignancy:   (+) Malignancy   Endometrial hyperplasia, R breast carcinoma in situ s/p lumpectomy        Other:                     Physical Exam  Normal systems: dental    Airway   Mallampati: III  TM distance: >3 FB  Neck ROM: full    Dental     Cardiovascular   Rhythm and rate: regular and normal      Pulmonary    breath sounds clear to auscultation                    Anesthesia Plan      History & Physical Review  History and physical reviewed and following examination; no interval change.    ASA Status:  2 .    NPO Status:  > 8 hours    Plan for General and ETT with Intravenous induction. Maintenance will be Balanced.    PONV prophylaxis:  Ondansetron (or other 5HT-3) and Dexamethasone or Solumedrol  Additional equipment: Videolaryngoscope and 2nd IV      Postoperative Care  Postoperative pain management:  IV analgesics.      Consents  Anesthetic plan, risks, benefits and alternatives discussed with:  Patient..          ANESTHESIA PREOP EVALUATION    HPI: Priyanka Cordero is a 57 year old female who presents for Procedure(s):  DaVinci Assisted Total Laparoscopic Hysterectomy, Bilateral Salpingo-Oophorectomy, Taylor Lymph Node Biopsy, Possible Cancer Staging, Anesthesia Block - Wound Class: II-Clean Contaminated    PMHx/PSHx/ROS:  Past Medical History:   Diagnosis Date     Hypertension      Obese     BMI 45      Pulmonary embolism (H) 2012       Past Surgical History:   Procedure Laterality Date     BREAST SURGERY      s/p lumpectomy 3-2017     GYN SURGERY      tubal ligation     wisdom teeth         Past Anes Hx: No personal or family h/o anesthesia problems    Soc Hx:   Social History   Substance Use Topics     Smoking status: Never Smoker     Smokeless tobacco: Never Used     Alcohol use Yes      Comment: rare       Allergies:   Allergies   Allergen Reactions     Azithromycin        Meds:   Current Facility-Administered Medications   Medication     fentaNYL (PF) (SUBLIMAZE) injection 25-50 mcg     midazolam (VERSED) injection 1-2 mg     naloxone (NARCAN) injection 0.1-0.4 mg     flumazenil (ROMAZICON) injection 0.2 mg     bupivacaine liposome (EXPAREL) 1.3 % LA inj susp 20 mL     ceFAZolin (ANCEF) 1 g vial to attach to  ml bag for ADULT or 50 ml bag for PEDS     lidocaine 1 % 1 mL     lidocaine (LMX4) kit     sodium chloride (PF) 0.9% PF flush 3 mL     sodium chloride (PF) 0.9% PF flush 3 mL     lactated ringers infusion     ceFAZolin (ANCEF) intermittent infusion 3 g (pre-mix)       NPO Status: >8 hours     Labs:    BMP:  Recent Labs   Lab Test  11/29/17   1049   POTASSIUM  3.9     CBC:   Recent Labs   Lab Test  11/29/17   1049   HGB  15.3     Coags:  No results for input(s): INR, PTT, FIBR in the last [id number] hours.    Odalis Bell MD  Staff Anesthesiologist  Pager [phone]  11/29/2017  11:31 AM                      .

## 2017-11-30 VITALS
HEIGHT: 66 IN | SYSTOLIC BLOOD PRESSURE: 143 MMHG | HEART RATE: 97 BPM | DIASTOLIC BLOOD PRESSURE: 66 MMHG | WEIGHT: 277.78 LBS | OXYGEN SATURATION: 93 % | RESPIRATION RATE: 21 BRPM | BODY MASS INDEX: 44.64 KG/M2 | TEMPERATURE: 97.9 F

## 2017-11-30 LAB
ANION GAP SERPL CALCULATED.3IONS-SCNC: 8 MMOL/L (ref 3–14)
BUN SERPL-MCNC: 12 MG/DL (ref 7–30)
CALCIUM SERPL-MCNC: 9.1 MG/DL (ref 8.5–10.1)
CHLORIDE SERPL-SCNC: 109 MMOL/L (ref 94–109)
CO2 SERPL-SCNC: 23 MMOL/L (ref 20–32)
CREAT SERPL-MCNC: 0.68 MG/DL (ref 0.52–1.04)
GFR SERPL CREATININE-BSD FRML MDRD: 89 ML/MIN/1.7M2
GLUCOSE SERPL-MCNC: 101 MG/DL (ref 70–99)
POTASSIUM SERPL-SCNC: 4.1 MMOL/L (ref 3.4–5.3)
SODIUM SERPL-SCNC: 140 MMOL/L (ref 133–144)

## 2017-11-30 PROCEDURE — 25000128 H RX IP 250 OP 636: Performed by: NURSE PRACTITIONER

## 2017-11-30 PROCEDURE — 99024 POSTOP FOLLOW-UP VISIT: CPT | Performed by: OBSTETRICS & GYNECOLOGY

## 2017-11-30 PROCEDURE — 25000132 ZZH RX MED GY IP 250 OP 250 PS 637: Performed by: OBSTETRICS & GYNECOLOGY

## 2017-11-30 RX ADMIN — ENOXAPARIN SODIUM 40 MG: 40 INJECTION SUBCUTANEOUS at 09:52

## 2017-11-30 RX ADMIN — HYDROCHLOROTHIAZIDE 25 MG: 25 TABLET ORAL at 08:59

## 2017-11-30 RX ADMIN — LISINOPRIL 40 MG: 40 TABLET ORAL at 08:59

## 2017-11-30 RX ADMIN — AMLODIPINE BESYLATE 5 MG: 5 TABLET ORAL at 08:59

## 2017-11-30 ASSESSMENT — PAIN DESCRIPTION - DESCRIPTORS: DESCRIPTORS: SORE

## 2017-11-30 NOTE — PROGRESS NOTES
Gynecologic Oncology Progress Note  11/30/2017    S: Doing well. Pain is minimal and well controlled.  Tolerated dinner last night without any nausea or emesis.  Voiding. Ambulating without dizziness.  Having scant vaginal bleeding that is decreasing.  Has not yet passed flatus.    O:  Vitals:    11/29/17 1945 11/29/17 2026 11/29/17 2239 11/30/17 0317   BP: 156/81 131/79 138/54 142/72   BP Location: Right arm Right arm Right arm Right arm   Pulse:  104 99 92   Resp: 18 18 18 19   Temp: 98.4  F (36.9  C) 97.6  F (36.4  C) 97  F (36.1  C) 98.1  F (36.7  C)   TempSrc: Oral Oral Oral Oral   SpO2: 96% 96% 98% 91%   Weight:       Height:         Gen: alert and oriented, resting in bed  Cardio: rrr, nl s1 and s2, no m/r/g  Resp: lungs CTAB, no wheezes or crackles  Abdomen: soft, appropriately tender to palpation  Incision: C/D/I covered with bandages- minimal shadowing  Extremities: BLEs non-tender, scant edema    IVF: 1400 mL prior to MN  PO: 240 mL prior to MN  UOP: 425 mL after surgery and before MN // 900 mL since MN    A: 57 year old POD#1 s/p TLH, BSO, PLND for stage IA grade 1 endometrial adenocarcinoma. Doing well postoperatively.    Dz: Stage IA grade 1 endometrial adenocarcinoma  FEN: Regular diet  Pain: tylenol, ibuprofen, oxycodone, TAP blocks  Heme: Hgb 15.3 > EBL 50  CV: HTN: Continue home HCTZ and lisinopril  Pulm: Encourage IS use, hx of provoked PE  GI: reg diet, prn antiemetics  : s/p aguiar  ID: No issues   Endo: No issues  Psych/Neuro: No issues  PPX: SCDs, IS, lovenox POD#1  Dispo: Home today     Lela Sin MD  OB/GYN PGY3  Gyn Onc pgr [phone]    IRodríguez personally examined and evaluated this patient on 11/30/17.  I discussed the patient with the resident and care team, and agree with the assessment and plan of care as documented in the residents note above.    I personally reviewed vital signs, laboratory values and imaging results.    Pt admitted post-op due to history of PE.   Home on prophylactic lovenox.      Brittney Ceballos MD  Gynecologic Oncology  [facility]

## 2017-11-30 NOTE — PROGRESS NOTES
Gynecologic Oncology Postoperative Check Note  11/29/2017    S: Doing well.  Reports some abdominal discomfort that is currently tolerable.  Denies any nausea or vomiting. Ambulating. Voiding.  Just was up to restroom and noticed small drops of blood in the toilet.    O:  Vitals:    11/29/17 1900 11/29/17 1915 11/29/17 1930 11/29/17 1945   BP: 150/82 151/79 154/85 156/81   BP Location:    Right arm   Resp: 16 16 18 18   Temp: 98.6  F (37  C)   98.4  F (36.9  C)   TempSrc: Oral   Oral   SpO2: 96% 97% 97% 96%   Weight:       Height:         Gen: alert and oriented, resting in bed  Cardio: rrr, nl s1 and s2, no m/r/g  Resp: lungs CTAB, no wheezes or crackles  Abdomen: soft, appropriately tender to palpation  Incision: C/D/I covered with bandages- no shadowing  Extremities: BLEs non-tender, scant edema    A: 57 year old POD#0 s/p TLH, BSO, PLND for stage IA grade 1 endometrial adenocarcinoma. Doing well postoperatively.    Dz: Stage IA grade 1 endometrial adenocarcinoma  FEN: Regular diet  Pain: tylenol, ibuprofen, oxycodone, TAP blocks  Heme: Hgb 15.3 > EBL 50  CV: HTN: HCTZ and lisinopril  Pulm: Encourage IS use, hx of provoked PE  GI: reg diet, prn antiemetics  : s/p aguiar  ID: No issues   Endo: No issues  Psych/Neuro: No issues  PPX: SCDs, IS, lovenox POD#1  Dispo: Home tomorrow     Lela Sin MD  OB/GYN PGY3  11/29/2017 8:18 PM  Gyn Onc pgr [phone]

## 2017-11-30 NOTE — OR NURSING
Dr Brianna Oviedo called for transfer order and to enquire about postop IV fluids.  She stateed no IV fluids needed for the floor.

## 2017-11-30 NOTE — PLAN OF CARE
Problem: Patient Care Overview  Goal: Plan of Care/Patient Progress Review  Outcome: Adequate for Discharge Date Met: 11/30/17  VSS. Reports minimal pain. Declines pain medications. Denies n/v. Up ad adams. Lap sites x5 covered with primapore dressing. Pt advised that dressing can be taken off this evening and has to remain on for 24 hours after surgery. Hypoactive bowel sounds and passing flatus. Voiding spontaneously. Tolerating regular diet. Lovenox teaching completed and pt spouse demonstrated injection. No further questions regarding Lovenox teaching. Discharge paperwork and medications reviewed. No further questions from patient or spouse. Pt discharged to home at 1045.

## 2017-11-30 NOTE — PLAN OF CARE
Problem: Surgery Nonspecified (Adult)  Goal: Signs and Symptoms of Listed Potential Problems Will be Absent, Minimized or Managed (Surgery Nonspecified)  Signs and symptoms of listed potential problems will be absent, minimized or managed by discharge/transition of care (reference Surgery Nonspecified (Adult) CPG).   VSS on 1L O2 via NC. Pt ambulated with SBA from cart to bathroom then to bed after arrival to unit. Voids spont. Some vaginal bleeding noted during void. Lap sites covered with primapore, small amount of shadowing, unchanged. Tolerating regular diet. Denies pain. PCD's on in bed. Cont. POC.    Pt is ok with bedside report

## 2017-11-30 NOTE — OP NOTE
DATE OF SERVICE:  11/29/2017      PREOPERATIVE DIAGNOSES:   1.  Complex atypical hyperplasia of the endometrium.   2.  Morbid obesity with BMI greater than 45.      POSTOPERATIVE DIAGNOSES:   1.  At least stage IA, grade 1 endometrial adenocarcinoma.   2.  Morbid obesity with BMI greater than 45.      PROCEDURES PERFORMED:   1.  Robotic-assisted total laparoscopic hysterectomy with bilateral salpingo-oophorectomy.   2.  Robotic-assisted bilateral pelvic lymph node dissection.      SURGEON:  Tabitha Pichardo MD      ASSISTANTS:   1.  Denny Bach (GYN Oncology Fellow).   2.  Brianna Oviedo (OB/GYN Resident).      ANESTHESIA:  General with preoperative tap blocks      ESTIMATED BLOOD LOSS:  50 mL      COMPLICATIONS:  None.      SPECIMENS REMOVED:   1.  Uterus, cervix, tubes and ovaries.   2.  Bilateral pelvic lymph nodes.      INDICATIONS:  Ms. Priyanka Cordero is a 57-year-old white female with a history of postmenopausal bleeding.  Biopsy was notable for complex endometrial hyperplasia.  She presented to the hospital for further surgical diagnosis and management.      FINDINGS:  On bimanual exam, she had apical prolapse.  She had a large smooth cervix.  She had a mobile uterus.  On diagnostic laparoscopy, she had a normal-appearing upper abdomen.  Her uterus was globally enlarged.  Her ovaries appeared normal bilaterally and her fallopian tubes appeared normal as well.  She had no ascites.  She had no evidence of gross metastatic disease.      DESCRIPTION OF PROCEDURE:  After informed consent, the patient was brought to the operating room where general anesthesia was administered.  She was prepped and draped in the dorsal lithotomy position.  A Mendenhall catheter was placed in her bladder.  She was given 3 grams of Ancef for preoperative prophylactic antibiotics and SCDs were placed on her lower extremities.  A surgical timeout was performed ensuring correct patient and procedure.      I first placed a speculum inside the  vagina and visualized the cervix.  I grasped the cervix with a single-tooth tenaculum.  I did initially place a total of 6 mL of ICG in the cervix in anticipation of a sentinel lymph node dissection.      Attention was turned to the abdomen and the umbilicus was grasped with penetrating towel clamps.  The Veress needle was placed.  Two attempts were required.  Opening pressure was 5.  After pneumoperitoneum was established a 7 mm incision was made above her umbilicus and robotic port was placed with the Visiport technique.  Under direct visualization, 2 left lateral robotic ports were placed, 1 right-sided robotic trocar was placed and one right-sided 12 mm accessory port was placed.  The patient was then placed in steep Trendelenburg.  The abdomen and pelvis were inspected with the above findings noted.  The robot was docked and instruments were placed in the patient's pelvis.      I first started by attempting to identify her sentinel lymph nodes and on the right hand side, I opened the right round ligament and opened the right retroperitoneal space, identified the right external iliac artery and vein and the right ureter.  She did not have an obvious sentinel lymph node on this side.  In a similar fashion on the left hand side I opened the left retroperitoneal space.  She did have some ICG green in this area, but it was only evident in lymphatic channels.  There was no specific node identified here either.  I did identify the ureter on this side as well as external iliac artery and vein.      I then proceeded with hysterectomy.  I skeletonized the left and right IP and revisualized the ureter.  The IP vascular complexes were then sacrificed with bipolar cautery and cut with the scissors.  The anterior peritoneum was then transected to the level of the cervix.  The anterior peritoneum was then carefully dissected off the anterior cervix and vagina in order to create a bladder flap.  The uterine arteries were  skeletonized bilaterally.  She had very large uterine arteries and vein.  These were sacrificed with bipolar cautery and cut with the scissors.  She did have some brisk bleeding from the left parametria, but this was controlled with additional bipolar cautery.  The uterus was sharply anteverted and cautery was used to perform a posterior colpotomy.  Colpotomy was carried circumferentially and thus the specimen was freed from the vagina.  Given her globally enlarged uterus, I did decide to place a 15 mm EndoCatch bag through the vagina and I placed the specimen in the bag.  We then placed her legs in high Trendelenburg and using traction against the bag we were eventually able to remove the uterus from the vagina.  After the specimen was removed attention was turned back to the pelvis.  The pelvis was irrigated and the vagina was closed from right to left with a 0 V-Loc PDS suture.  I did place one additional reinforcement suture in the right cuff of the vagina due to how thick her cuff was.  This was a figure-of-eight 0 Vicryl suture.  The cuff was irrigated again and noted to be very hemostatic.  Frozen section returned as a 3.5 cm grade 1 noninvasive endometrial adenocarcinoma.  The decision was therefore made to proceed with pelvic lymphadenectomy.      On the left hand side, I reidentified the left external iliac artery and vein, the left ureter and left continuation of the hypogastric was placed medially on stretch.  The obturator nerve was identified deep in the obturator space.  She did have some accessory obturator vessels deep in the obturator space.  The lymph node packet was removed from the external iliac artery to the level of the circumflex vein and then carefully dissected free from the external iliac vein.  The obturator lymph nodes were removed from the obturator space, all with good visualization of the obturator nerve.  These lymph nodes were placed in an Endocatch bag and removed through the  accessory port.  A Ray-Brandan was placed in this space for additional hemostasis and was removed at the end of the procedure.      In a similar fashion on the right hand side, the right external iliac artery and vein were identified.  The right continuation of the hypogastric was identified and retracted medially and the obturator nerve was identified deep in the obturator space.  The ureter was reidentified as well.  The external iliac nodes were then removed from the external iliac artery from the bifurcation to the level of the circumflex iliac vein and then these nodes were carefully dissected free from the iliac vein.  The obturator nerves were then removed from the obturator space, all with good visualization of the obturator nerve.  The space was very hemostatic.  The abdomen and pelvis were then inspected again and excellent hemostasis was noted.  The instruments were removed.  The robot was undocked and the right side accessory port site was closed with interrupted 0 Vicryl suture.  The skin was closed with 4-0 Vicryl suture and Steri-Strips were placed over the incisions.  The vagina was inspected and noted to be hemostatic.  The Mendenhall catheter was removed.  The patient was awoken from anesthesia and brought to the recovery room in stable condition.         FRANCES PAGE MD             D: 2017 16:21   T: 2017 07:07   MT: FLORECITA      Name:     AGUSTIN SHARMA   MRN:      [id number]        Account:        [id number]   :      1960           Procedure Date: 2017      Document: [id number]

## 2017-11-30 NOTE — PLAN OF CARE
Problem: Patient Care Overview  Goal: Plan of Care/Patient Progress Review  Outcome: Therapy, progress toward functional goals as expected  POD#1 AVSS. Pt declined pain medication this shift. Tylenol, ibuprofen and oxy in place if needed. Denies nausea. PIV saline locked. 5 lap sites covered with primapore. Small amount of drainage marked on middle lap site. Tolerating regular diet. On 1LNC, capno in place. Up with SBA. Voiding spont with small amount of vaginal bleeding. No gas or BM yet. Encouraged use of IS. Plan is to d/c today. Continue with plan. Pt would like to bedside report at shift change.

## 2017-12-06 ENCOUNTER — TELEPHONE (OUTPATIENT)
Dept: ONCOLOGY | Facility: CLINIC | Age: 57
End: 2017-12-06

## 2017-12-06 NOTE — TELEPHONE ENCOUNTER
Priyanka was called for post hospital follow up. She is doing very well, and has no issues or questions. For pain she is taking ibuprofen on occasion. She is eating/drinking and going to the bathroom normally. She will call with any questions or concerns.    Tiff Reyes RN

## 2017-12-07 ENCOUNTER — TELEPHONE (OUTPATIENT)
Dept: ONCOLOGY | Facility: CLINIC | Age: 57
End: 2017-12-07

## 2017-12-07 LAB — COPATH REPORT: NORMAL

## 2017-12-07 NOTE — TELEPHONE ENCOUNTER
Called to review pathology results.   She is doing well  Low risk disease. Plan to followup as scheduled.   Tabitha Pichardo MD  Gynecologic Oncology  Pager [phone]

## 2025-06-28 ENCOUNTER — APPOINTMENT (OUTPATIENT)
Dept: CT IMAGING | Facility: CLINIC | Age: 65
End: 2025-06-28
Attending: PHYSICIAN ASSISTANT
Payer: COMMERCIAL

## 2025-06-28 ENCOUNTER — HOSPITAL ENCOUNTER (EMERGENCY)
Facility: CLINIC | Age: 65
Discharge: HOME | End: 2025-06-28
Attending: PHYSICIAN ASSISTANT | Admitting: PHYSICIAN ASSISTANT
Payer: COMMERCIAL

## 2025-06-28 ENCOUNTER — APPOINTMENT (OUTPATIENT)
Dept: GENERAL RADIOLOGY | Facility: CLINIC | Age: 65
End: 2025-06-28
Attending: PHYSICIAN ASSISTANT
Payer: COMMERCIAL

## 2025-06-28 VITALS
HEART RATE: 77 BPM | HEIGHT: 66 IN | BODY MASS INDEX: 39.22 KG/M2 | TEMPERATURE: 97.8 F | OXYGEN SATURATION: 96 % | WEIGHT: 244.05 LBS | DIASTOLIC BLOOD PRESSURE: 79 MMHG | SYSTOLIC BLOOD PRESSURE: 154 MMHG | RESPIRATION RATE: 16 BRPM

## 2025-06-28 DIAGNOSIS — S01.511A LIP LACERATION, INITIAL ENCOUNTER: ICD-10-CM

## 2025-06-28 DIAGNOSIS — S52.592A OTHER CLOSED FRACTURE OF DISTAL END OF LEFT RADIUS, INITIAL ENCOUNTER: ICD-10-CM
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
## 2025-06-28 DIAGNOSIS — S03.2XXA TOOTH LUXATION, INITIAL ENCOUNTER: ICD-10-CM

## 2025-06-28 DIAGNOSIS — S52.615A CLOSED NONDISPLACED FRACTURE OF STYLOID PROCESS OF LEFT ULNA, INITIAL ENCOUNTER: ICD-10-CM

## 2025-06-28 LAB
ALBUMIN UR-MCNC: NEGATIVE MG/DL
ANION GAP SERPL CALCULATED.3IONS-SCNC: 14 MMOL/L (ref 7–15)
APPEARANCE UR: CLEAR
BACTERIA #/AREA URNS HPF: ABNORMAL /HPF
BASOPHILS # BLD AUTO: 0 10E3/UL (ref 0–0.2)
BASOPHILS NFR BLD AUTO: 0 %
BILIRUB UR QL STRIP: NEGATIVE
BUN SERPL-MCNC: 20.3 MG/DL (ref 8–23)
CALCIUM SERPL-MCNC: 9.5 MG/DL (ref 8.8–10.4)
CHLORIDE SERPL-SCNC: 104 MMOL/L (ref 98–107)
COLOR UR AUTO: ABNORMAL
CREAT SERPL-MCNC: 0.54 MG/DL (ref 0.51–0.95)
EGFRCR SERPLBLD CKD-EPI 2021: >90 ML/MIN/1.73M2
EOSINOPHIL # BLD AUTO: 0 10E3/UL (ref 0–0.7)
EOSINOPHIL NFR BLD AUTO: 0 %
ERYTHROCYTE [DISTWIDTH] IN BLOOD BY AUTOMATED COUNT: 13.3 % (ref 10–15)
GLUCOSE SERPL-MCNC: 150 MG/DL (ref 70–99)
GLUCOSE UR STRIP-MCNC: NEGATIVE MG/DL
HCO3 SERPL-SCNC: 22 MMOL/L (ref 22–29)
HCT VFR BLD AUTO: 40.6 % (ref 35–47)
HGB BLD-MCNC: 13.6 G/DL (ref 11.7–15.7)
HGB UR QL STRIP: NEGATIVE
HOLD SPECIMEN: NORMAL
HOLD SPECIMEN: NORMAL
HYALINE CASTS: 1 /LPF
IMM GRANULOCYTES # BLD: 0.1 10E3/UL
IMM GRANULOCYTES NFR BLD: 1 %
KETONES UR STRIP-MCNC: 20 MG/DL
LEUKOCYTE ESTERASE UR QL STRIP: NEGATIVE
LYMPHOCYTES # BLD AUTO: 1.2 10E3/UL (ref 0.8–5.3)
LYMPHOCYTES NFR BLD AUTO: 9 %
MCH RBC QN AUTO: 26.9 PG (ref 26.5–33)
MCHC RBC AUTO-ENTMCNC: 33.5 G/DL (ref 31.5–36.5)
MCV RBC AUTO: 80 FL (ref 78–100)
MONOCYTES # BLD AUTO: 0.6 10E3/UL (ref 0–1.3)
MONOCYTES NFR BLD AUTO: 4 %
MUCOUS THREADS #/AREA URNS LPF: PRESENT /LPF
NEUTROPHILS # BLD AUTO: 11.6 10E3/UL (ref 1.6–8.3)
NEUTROPHILS NFR BLD AUTO: 86 %
NITRATE UR QL: NEGATIVE
NRBC # BLD AUTO: 0 10E3/UL
NRBC BLD AUTO-RTO: 0 /100
PH UR STRIP: 6 [PH] (ref 5–7)
PLATELET # BLD AUTO: 274 10E3/UL (ref 150–450)
POTASSIUM SERPL-SCNC: 3.8 MMOL/L (ref 3.4–5.3)
RBC # BLD AUTO: 5.06 10E6/UL (ref 3.8–5.2)
RBC URINE: 1 /HPF
SODIUM SERPL-SCNC: 140 MMOL/L (ref 135–145)
SP GR UR STRIP: 1.01 (ref 1–1.03)
SQUAMOUS EPITHELIAL: 2 /HPF
TROPONIN T SERPL HS-MCNC: 14 NG/L
TROPONIN T SERPL HS-MCNC: 14 NG/L
UROBILINOGEN UR STRIP-MCNC: NORMAL MG/DL
WBC # BLD AUTO: 13.5 10E3/UL (ref 4–11)
WBC URINE: 3 /HPF

## 2025-06-28 PROCEDURE — 84484 ASSAY OF TROPONIN QUANT: CPT | Performed by: PHYSICIAN ASSISTANT

## 2025-06-28 PROCEDURE — 258N000003 HC RX IP 258 OP 636: Performed by: PHYSICIAN ASSISTANT

## 2025-06-28 PROCEDURE — 70450 CT HEAD/BRAIN W/O DYE: CPT

## 2025-06-28 PROCEDURE — 80048 BASIC METABOLIC PNL TOTAL CA: CPT | Performed by: PHYSICIAN ASSISTANT

## 2025-06-28 PROCEDURE — 250N000011 HC RX IP 250 OP 636: Performed by: PHYSICIAN ASSISTANT

## 2025-06-28 PROCEDURE — 73090 X-RAY EXAM OF FOREARM: CPT | Mod: LT

## 2025-06-28 PROCEDURE — 96361 HYDRATE IV INFUSION ADD-ON: CPT | Performed by: PHYSICIAN ASSISTANT

## 2025-06-28 PROCEDURE — 73130 X-RAY EXAM OF HAND: CPT | Mod: LT

## 2025-06-28 PROCEDURE — 90471 IMMUNIZATION ADMIN: CPT | Performed by: PHYSICIAN ASSISTANT

## 2025-06-28 PROCEDURE — 36415 COLL VENOUS BLD VENIPUNCTURE: CPT | Performed by: PHYSICIAN ASSISTANT

## 2025-06-28 PROCEDURE — 99285 EMERGENCY DEPT VISIT HI MDM: CPT | Mod: 25 | Performed by: PHYSICIAN ASSISTANT

## 2025-06-28 PROCEDURE — 250N000013 HC RX MED GY IP 250 OP 250 PS 637: Performed by: PHYSICIAN ASSISTANT

## 2025-06-28 PROCEDURE — 96360 HYDRATION IV INFUSION INIT: CPT | Mod: 59 | Performed by: PHYSICIAN ASSISTANT

## 2025-06-28 PROCEDURE — 70486 CT MAXILLOFACIAL W/O DYE: CPT

## 2025-06-28 PROCEDURE — 85025 COMPLETE CBC W/AUTO DIFF WBC: CPT | Performed by: PHYSICIAN ASSISTANT

## 2025-06-28 PROCEDURE — 93005 ELECTROCARDIOGRAM TRACING: CPT | Mod: XU | Performed by: PHYSICIAN ASSISTANT

## 2025-06-28 PROCEDURE — 72125 CT NECK SPINE W/O DYE: CPT

## 2025-06-28 PROCEDURE — 81001 URINALYSIS AUTO W/SCOPE: CPT | Performed by: PHYSICIAN ASSISTANT

## 2025-06-28 PROCEDURE — 90715 TDAP VACCINE 7 YRS/> IM: CPT | Performed by: PHYSICIAN ASSISTANT

## 2025-06-28 PROCEDURE — 12013 RPR F/E/E/N/L/M 2.6-5.0 CM: CPT | Performed by: PHYSICIAN ASSISTANT

## 2025-06-28 PROCEDURE — 25600 CLTX DST RDL FX/EPHYS SEP WO: CPT | Mod: LT | Performed by: PHYSICIAN ASSISTANT

## 2025-06-28 RX ORDER — ACETAMINOPHEN 500 MG
1000 TABLET ORAL ONCE
Status: COMPLETED | OUTPATIENT
Start: 2025-06-28 | End: 2025-06-28

## 2025-06-28 RX ORDER — OXYCODONE HYDROCHLORIDE 5 MG/1
5 TABLET ORAL ONCE
Refills: 0 | Status: COMPLETED | OUTPATIENT
Start: 2025-06-28 | End: 2025-06-28

## 2025-06-28 RX ORDER — LIDOCAINE HYDROCHLORIDE AND EPINEPHRINE 10; 10 MG/ML; UG/ML
INJECTION, SOLUTION INFILTRATION; PERINEURAL
Status: DISCONTINUED
Start: 2025-06-28 | End: 2025-06-28 | Stop reason: HOSPADM

## 2025-06-28 RX ORDER — OXYCODONE HYDROCHLORIDE 5 MG/1
5 TABLET ORAL EVERY 6 HOURS PRN
Qty: 8 TABLET | Refills: 0 | Status: SHIPPED | OUTPATIENT
Start: 2025-06-28

## 2025-06-28 RX ADMIN — SODIUM CHLORIDE 1000 ML: 0.9 INJECTION, SOLUTION INTRAVENOUS at 11:23

## 2025-06-28 RX ADMIN — CLOSTRIDIUM TETANI TOXOID ANTIGEN (FORMALDEHYDE INACTIVATED), CORYNEBACTERIUM DIPHTHERIAE TOXOID ANTIGEN (FORMALDEHYDE INACTIVATED), BORDETELLA PERTUSSIS TOXOID ANTIGEN (GLUTARALDEHYDE INACTIVATED), BORDETELLA PERTUSSIS FILAMENTOUS HEMAGGLUTININ ANTIGEN (FORMALDEHYDE INACTIVATED), BORDETELLA PERTUSSIS PERTACTIN ANTIGEN, AND BORDETELLA PERTUSSIS FIMBRIAE 2/3 ANTIGEN 0.5 ML: 5; 2; 2.5; 5; 3; 5 INJECTION, SUSPENSION INTRAMUSCULAR at 11:17

## 2025-06-28 RX ADMIN — ACETAMINOPHEN 1000 MG: 500 TABLET, FILM COATED ORAL at 14:07

## 2025-06-28 RX ADMIN — OXYCODONE HYDROCHLORIDE 5 MG: 5 TABLET ORAL at 14:07

## 2025-06-28 RX ADMIN — AMOXICILLIN AND CLAVULANATE POTASSIUM 1 TABLET: 875; 125 TABLET, FILM COATED ORAL at 15:39

## 2025-06-28 ASSESSMENT — ACTIVITIES OF DAILY LIVING (ADL)
ADLS_ACUITY_SCORE: 42

## 2025-06-28 ASSESSMENT — COLUMBIA-SUICIDE SEVERITY RATING SCALE - C-SSRS
2. HAVE YOU ACTUALLY HAD ANY THOUGHTS OF KILLING YOURSELF IN THE PAST MONTH?: NO
1. IN THE PAST MONTH, HAVE YOU WISHED YOU WERE DEAD OR WISHED YOU COULD GO TO SLEEP AND NOT WAKE UP?: NO
6. HAVE YOU EVER DONE ANYTHING, STARTED TO DO ANYTHING, OR PREPARED TO DO ANYTHING TO END YOUR LIFE?: NO

## 2025-06-28 NOTE — ED TRIAGE NOTES
CC: Left wrist pain. Fall this AM 90 mins PTA while on a walk. Walks daily, today went a little longer than she normally does.     Fell forward onto cement. Abrasions noted to face and left knee. Lip swelling. Left elbow lac.     No LOC. Remembers fall. Able to get up after fall with assistance from  and neighbors.    Not on blood thinners.   Triage Assessment (Adult)       Row Name 06/28/25 1026          Triage Assessment    Airway WDL WDL        Respiratory WDL    Respiratory WDL WDL        Cognitive/Neuro/Behavioral WDL    Cognitive/Neuro/Behavioral WDL WDL

## 2025-06-28 NOTE — ED PROVIDER NOTES
Emergency Department Note      History of Present Illness     Chief Complaint   Fall      HPI   Priyanka Cordero is a 65 year old female with history of hypertension, type 2 diabetes and DVT who presents to the ED with her  for evaluation of a fall. The patient reports that she was on her regular 2 mile walk this morning and when she was walking up to the door of her house she tripped and fell face forward to the ground landing on her left wrist and hitting her face against the ground. She states that she felt weak and adds that her shoe came loose before she fell. She denies any loss of consciousness of dizziness. She reports that her two top front teeth were pushed back in the fall, but are not loose currently. She states that her left wrist is swollen and painful. She denies any leg pain, back pain and neck pain. She endorses right sided shoulder stiffness, adding that she had this on going stiffness before the fall. She states that she was able to walk into the ED. She endorses still feeling weak in the ED. She states that she took 2 Aleve this morning. She reports that recently her blood pressure has been abnormally low for her. She reports that it was 106/60 at her doctors office this week and 110/60 today after the fall.  No rib or back pain, abdominal pain.  No numbness or tingling in the extremities. No chest pain, sob, or palpitations.  No vision changes.    Independent Historian   None    Review of External Notes   Last tetanus 2016.  Reviewed Andrea Arenas MD note from  [facility]he[facility].  Note hx of hypertension, pulmonary embolism (2012/2013), type 2 diabetes, bilateral carpal tunnel syndrome (s/p right carpal tunnel release April 2025 and left carpal tunnel release May 2025), IT band syndrome, hip osteoarthritis, knee osteoarthritis s/p right TKA, history of right distal radius fracture (2021), obesity, colonic polyp, and history of breast cancer and endometrial cancer     Past Medical History  "    Medical History and Problem List   Hypertension   Obesity   Pulmonary embolism   Breast cancer   Clon polyps   Menorrhagia   Endometrial cancer   Neoplasm of right breast   Osteoarthritis   Chronic right knee pain   Type 2 diabetes   DVT     Medications   Norvasc   Lisinopril   Asprin 81 mg   Oretic   Zestril   Roxicodone   Voltaren   Glucophage   Senokot     Surgical History   Lumpectomy - right   Hysterectomy total, bilateral salpingo-oophorectomy, node dissection - combined  Tubal ligation   Total knee arthroplasty - right     Physical Exam     Patient Vitals for the past 24 hrs:   BP Temp Temp src Pulse Resp SpO2 Height Weight   06/28/25 1300 (!) 150/74 -- -- 87 -- 99 % -- --   06/28/25 1215 (!) 153/75 -- -- -- -- 97 % -- --   06/28/25 1030 125/75 97.8  F (36.6  C) Oral 93 16 97 % 1.676 m (5' 6\") 110.7 kg (244 lb 0.8 oz)     Physical Exam  General: Alert and awake.  Head:  Scalp is NC/AT without bruising, hematomas.  Eyes:  Globes normal and atraumatic.  PERRL, EOMI   ENT:  Bruising and abrasion to upper lip area. 0.5  cm lac to right upper lip vermillion border which does not go through and through.  No fb.      TM's normal bilaterally    Oropharynx  w/2.5 cm lac to inner upper lip which does not go through and through.  Luxation of the medial maxillary incisors both which are not mobile or fractured and well secured.  No loose teeth or dental fractures.  Neck:  Normal range of motion without rigidity. Able to rotate 45 degrees BL. No bruising or swelling.  CV:  Regular rate and rhythm. No M/R/G.  Resp:  Breath sounds are clear bilaterally. Normal effort.  Abdomen: Abdomen is soft, no distension, no tenderness, no masses.  MS:  No midline cervical, thoracic, or lumbar tenderness    No tenderness over sternum, scapula, ribs, clavicles.    Extremities atraumatic.  PROM of all other major joints performed and unremarkable.  Skin:  Warm and dry, No bruising.  Extremities warm and well perfused  Neuro:  Alert " and oriented to self, place, time situation.  No facial asymmetry, tongue protrudes midline, speech not slurred..  5/5 strength BL to upper and lower extremities.   Normal sensation to light touch BL in extremities and face. GCS: 15. Gait normal.  Psych:  Alert.  Normal affect.  Cooperative.      Diagnostics     Lab Results   Labs Ordered and Resulted from Time of ED Arrival to Time of ED Departure   BASIC METABOLIC PANEL - Abnormal       Result Value    Sodium 140      Potassium 3.8      Chloride 104      Carbon Dioxide (CO2) 22      Anion Gap 14      Urea Nitrogen 20.3      Creatinine 0.54      GFR Estimate >90      Calcium 9.5      Glucose 150 (*)    ROUTINE UA WITH MICROSCOPIC REFLEX TO CULTURE - Abnormal    Color Urine Light Yellow      Appearance Urine Clear      Glucose Urine Negative      Bilirubin Urine Negative      Ketones Urine 20 (*)     Specific Gravity Urine 1.013      Blood Urine Negative      pH Urine 6.0      Protein Albumin Urine Negative      Urobilinogen Urine Normal      Nitrite Urine Negative      Leukocyte Esterase Urine Negative      Bacteria Urine Many (*)     Mucus Urine Present (*)     RBC Urine 1      WBC Urine 3      Squamous Epithelials Urine 2 (*)     Hyaline Casts Urine 1     CBC WITH PLATELETS AND DIFFERENTIAL - Abnormal    WBC Count 13.5 (*)     RBC Count 5.06      Hemoglobin 13.6      Hematocrit 40.6      MCV 80      MCH 26.9      MCHC 33.5      RDW 13.3      Platelet Count 274      % Neutrophils 86      % Lymphocytes 9      % Monocytes 4      % Eosinophils 0      % Basophils 0      % Immature Granulocytes 1      NRBCs per 100 WBC 0      Absolute Neutrophils 11.6 (*)     Absolute Lymphocytes 1.2      Absolute Monocytes 0.6      Absolute Eosinophils 0.0      Absolute Basophils 0.0      Absolute Immature Granulocytes 0.1      Absolute NRBCs 0.0     TROPONIN T, HIGH SENSITIVITY - Normal    Troponin T, High Sensitivity 14     TROPONIN T, HIGH SENSITIVITY - Normal    Troponin T, High  Sensitivity 14         Imaging   CT Cervical Spine w/o Contrast   Final Result   IMPRESSION:   1.  No evidence of acute fracture or subluxation of the cervical spine by CT imaging.   2.  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      CT Facial Bones without Contrast   Final Result   IMPRESSION:    1.  No evidence of acute maxillofacial fracture by CT imaging.         Head CT w/o contrast   Final Result   IMPRESSION:     1.  No evidence of acute intracranial hemorrhage or mass effect.      XR Hand Left G/E 3 Views   Final Result   IMPRESSION: Acute comminuted nondisplaced intra-articular fracture of the distal radius without angulation of the fracture fragments. Acute mildly displaced ulnar styloid process fracture. Osteopenia. Advanced degenerative changes at the first CMC joint.    Tiny olecranon spur.      Radius/Ulna XR,  PA &LAT, left   Final Result   IMPRESSION: Acute comminuted nondisplaced intra-articular fracture of the distal radius without angulation of the fracture fragments. Acute mildly displaced ulnar styloid process fracture. Osteopenia. Advanced degenerative changes at the first CMC joint.    Tiny olecranon spur.          EKG   ECG taken at 1255, ECG read at 1303  Sinus rhythm with 1st degree AV block  Poor R wave progression; consider anterior infarct, lead placement, or normal variant    Rate 85 bpm. LA interval 250 ms. QRS duration 92 ms. QT/QTc 384/456 ms. P-R-T axes 62 -5 49.    Independent Interpretation   I independently reviewed the CT head no shift, bleed or skull fracture.     I independently reviewed the X-ray left hand and L ulna and radius. non-displaced distal radius and ulnar tyloid fractures to left arm    ED Course      Medications Administered   Medications   lidocaine 1% with EPINEPHrine 1:100,000 1 %-1:372150 injection (has no administration in time range)   amoxicillin-clavulanate (AUGMENTIN) 875-125 MG  per tablet 1 tablet (has no administration in time range)   Tdap (tetanus-diphtheria-acell pertussis) (ADACEL) injection 0.5 mL (0.5 mLs Intramuscular $Given 6/28/25 1117)   sodium chloride 0.9% BOLUS 1,000 mL (0 mLs Intravenous Stopped 6/28/25 1319)   acetaminophen (TYLENOL) tablet 1,000 mg (1,000 mg Oral $Given 6/28/25 1407)   oxyCODONE (ROXICODONE) tablet 5 mg (5 mg Oral $Given 6/28/25 1407)       Procedures   Procedures     Splint Placement     Procedure: Splint Placement     Indication: Fracture    Consent: Verbal     Location: Left Arm    Preparation: Wounds were cleansed and dressed with a non-adherent bandage     Procedure detail:    Splint was applied by Myself  Splint type: Sugar-tong   Splint materilal: Fiberglass  After placement I checked and adjusted the fit as needed to ensure proper positioning/fit   Sensation and circulation are intact after splint placement     Patient Status: The patient tolerated the procedure well: Yes. There were no complications.       Laceration Repair      Procedure: Laceration Repair    Indication: Laceration    Consent: Verbal    Tetanus status reviewed    Location: Left inter lip    Length: 2.5 cm    Preparation: Irrigation with Sterile Saline.    Anesthesia/Sedation: Lidocaine with Epinephrine - 1%      Treatment/Exploration: Wound explored, no foreign bodies found     Closure: The wound was closed with one layer. Skin/superficial layer was closed with 4 x 5-0 Polyglactin rapide (vicryl) absorbable  using Interrupted sutures.     Patient Status: The patient tolerated the procedure well: Yes. There were no complications.     Laceration Repair      Procedure: Laceration Repair    Indication: Laceration    Consent: Verbal    Tetanus status reviewed    Location: Left Lip involving vermillion border     Length: 0.5 cm    Preparation: Irrigation with Sterile Saline.    Anesthesia/Sedation: Lidocaine with Epinephrine - 1%      Treatment/Exploration: Wound explored, no foreign  bodies found     Closure: The wound was closed with one layer. Skin/superficial layer was closed with 1 x 5-0 Polypropylene (prolene)  using Interrupted sutures.     Patient Status: The patient tolerated the procedure well: Yes. There were no complications.     Discussion of Management   None    ED Course   ED Course as of 06/28/25 1531   Sat Jun 28, 2025   1057 I obtained history and examined the patient as noted above.    1203 Dr. Pascual discussed the case with Raza Davis PA-C. Discussed presentation, exam, ddx, plan.     1204 XR Hand Left G/E 3 Views  Dr. Pascual' interpretation: Distal radius and ulna fx.   1211 Dr. Pascual' evaluation.       Additional Documentation  None    Medical Decision Making / Diagnosis     CMS Diagnoses: None    MIPS   None               [facility]   Priyanka Cordero is a 65 year old female who presents for evaluation after what sounds like ultimately mechanical fall.  Did feel little bit weak and so considered alternative etiologies EKG shows first-degree AV block without ischemic changes or dysrhythmia troponins normal x 2 vitals are normal nothing to suggest cardiac event neuroexam normal nothing to suggest stroke or seizure.  Labs notable for slight nonspecific leukocytosis urine clear no fever or other infectious symptoms or evidence of sepsis.  Glucose and electrolytes reassuring.  CT head facial bones and cervical spine are negative for any acute injury.  Does have luxation of the maxillary incisors however these are not acutely loose or needing splinting and are not fractured no indication for emergent OMFS consult.  Lacerations repaired as above with absorbable and nonabsorbable suture.  Outer suture to come out in 5 days inner sutures dissolvable discussed diet modifications.  Outpatient close follow-up with dental in the next 48 hours.  We will prophylax with Augmentin.  There is no evidence of foreign body tendon or neurovascular injury.  Also w/left arm injury and non-displaced  distal radius and ulnar styloid fracture.  This was splinted as above.  CMS intact, no evidence of neurovascular compromise.  No indication for emergent reduction or orthopedic consultation from the ED.  Head to toe trauma exam otherwise non-concerning.  Discussed orthopedic follow-up and patient will call to schedule appointment for follow-up.  Discussed RICE and pain control.  Strict return precautions given for signs of compartment syndrome or neurovascular compromise and these were provided in writing as well as signs of infection, weakness, etc.  Ambulated well in the ED no longer feels weak, comfortable with discharge to home.      Disposition   The patient was discharged.     Diagnosis     ICD-10-CM    1. Lip laceration, initial encounter  S01.511A       2. Fall, initial encounter  W19.XXXA       3. Other closed fracture of distal end of left radius, initial encounter  S52.592A       4. Closed nondisplaced fracture of styloid process of left ulna, initial encounter  S52.615A       5. Tooth luxation, initial encounter  S03.2XXA            Discharge Medications   New Prescriptions    AMOXICILLIN-CLAVULANATE (AUGMENTIN) 875-125 MG TABLET    Take 1 tablet by mouth 2 times daily for 5 days.    OXYCODONE (ROXICODONE) 5 MG TABLET    Take 1 tablet (5 mg) by mouth every 6 hours as needed for moderate to severe pain.         Scribe Disclosure:  Ghulam CHATTERJEE, am serving as a scribe at 10:38 AM on 6/28/2025 to document services personally performed by Raza Davis, based on my observations and the provider's statements to me.        Raza Davis PA-C  06/28/25 [id number]

## 2025-06-28 NOTE — ED NOTES
Emergency Department Attending Supervision Note  6/28/2025  12:04 PM    I evaluated this patient in conjunction with Raza Davis PA-C  I have participated in the care of the patient and personally performed key elements of the history, exam, and medical decision making.      HPI:   Priyanka Cordero is a 65 year old female who presents after a fall. The patient went on her normal walk today. She tripped and fell when she arrived home. She landed on her left wrist and face. No LOC.     Workup in ED so far was notable for distal left radius/ulna fx.     Independent Historian:   None    Review of External Notes:   Rheumatology note 6/24/25:  referred for hand/finger pain/stiffness also shoulder pain. Suspected adhesive capsulitis (shoulder). PT recommended. PMR and GCA in differential. Continue hand therapy.     EXAM:    Eyes: PEERL, EOMI B/L  Neck: No JVD noted. FROM   Oropharynx: MMM. Both central upper incisors are pushed back, but not fractured. No LeForte 1 pain..  Cardiovascular: normal rate, Regular rhythm and normal heart sounds.  No murmur heard. Equal B/L peripheral pulses.  Pulmonary/Chest: Effort normal and breath sounds normal. No respiratory distress. Patient has no wheezes. Patient has no rales.   Gastrointestinal: Soft. There is no tenderness.   Musculoskeletal/Extremities: Tenderness and swelling to the left wrist.   Neurological: Alert  Skin: Abrasion to nose and upper lip (also an upper lip laceration). Abrasion on left elbow and left knee as well.   Psychiatric: The patient appears calm.      Assessments, Consultations/Discussion of Management or Tests, Independent Image interpretation     ED Course as of 06/28/25 1655   Sat Jun 28, 2025   1057 I obtained history and examined the patient as noted above.    1203 Dr. Pascual discussed the case with Raza Davis PA-C. Discussed presentation, exam, ddx, plan.     1204 XR Hand Left G/E 3 Views  Dr. Pascual' interpretation: Distal radius and ulna fx.   1211  Dr. Pascual' evaluation.     ECG  ECG results from 06/28/25   EKG 12-lead, tracing only     Value    Systolic Blood Pressure     Diastolic Blood Pressure     Ventricular Rate 85    Atrial Rate 85    LA Interval 250    QRS Duration 92        QTc 456    P Axis 62    R AXIS -5    T Axis 49    Interpretation ECG      Sinus rhythm with 1st degree A-V block  Poor R-wave progression ; consider anterior infarct, lead placement, or normal variant  Abnormal ECG  No previous ECGs available  Interpreted by Dr. Pascual at 1303           Optional/Additional Documentation: None     MEDICAL DECISION MAKING/ASSESSMENT AND PLAN:   This 65-year-old presents after falling while coming back to her house.  Labs are, overall, reassuring.  There is a leukocytosis though this certainly could be an acute phase reactant given the patient's injuries today.  X-ray does note distal radius and ulna fracture.  There is a laceration on the upper lip which will be sutured.  Wounds were cleansed and the patient's fracture was splinted.  No reduction was required.  I think that [facility] hand trauma follow-up is reasonable though the patient notes that she has been seen at [facility] and it sounds like she would like to go back there.  I will encourage her to make that phone call on Monday.  Copies of her images were made from her visit today and sent with the patient for discharge    Impression:    ICD-10-CM    1. Lip laceration, initial encounter  S01.511A       2. Fall, initial encounter  W19.XXXA       3. Other closed fracture of distal end of left radius, initial encounter  S52.592A       4. Closed nondisplaced fracture of styloid process of left ulna, initial encounter  S52.615A       5. Tooth luxation, initial encounter  S03.2XXA              DISPOSITION:  LEANDRO Pascual, DO  6/28/2025  [facility] EMERGENCY DEPT         Corey Pascual, DO  06/28/25 3161

## 2025-06-30 LAB
ATRIAL RATE - MUSE: 85 BPM
DIASTOLIC BLOOD PRESSURE - MUSE: NORMAL MMHG
INTERPRETATION ECG - MUSE: NORMAL
P AXIS - MUSE: 62 DEGREES
PR INTERVAL - MUSE: 250 MS
QRS DURATION - MUSE: 92 MS
QT - MUSE: 384 MS
QTC - MUSE: 456 MS
R AXIS - MUSE: -5 DEGREES
SYSTOLIC BLOOD PRESSURE - MUSE: NORMAL MMHG
T AXIS - MUSE: 49 DEGREES
VENTRICULAR RATE- MUSE: 85 BPM

## (undated) DEVICE — SU VICRYL 3-0 SH 27" J316H

## (undated) DEVICE — DRSG PRIMAPORE 02X3" 7133

## (undated) DEVICE — ENDO POUCH GOLD 10MM ECATCH 173050G

## (undated) DEVICE — WIPES FOLEY CARE SURESTEP PROVON DFC100

## (undated) DEVICE — KIT PATIENT POSITIONING PIGAZZI LATEX FREE 40580

## (undated) DEVICE — ENDO TROCAR 12MM VERSAONE BLADELESS W/STD FIX CAN NONB12STF

## (undated) DEVICE — PROTECTOR ARM ONE-STEP TRENDELENBURG 40418

## (undated) DEVICE — LINEN TOWEL PACK X30 5481

## (undated) DEVICE — RETR ELEV / UTERINE MANIPULATOR V-CARE LG CUP 60-6085-202A

## (undated) DEVICE — NDL INSUFFLATION 120MM VERRES 172015

## (undated) DEVICE — Device

## (undated) DEVICE — PACK GOWN 3/PK DISP XL SBA32GPFCB

## (undated) DEVICE — DAVINCI XI DRAPE COLUMN 470341

## (undated) DEVICE — DAVINCI XI GRASPER ENDOWRIST PROGRASP 470093

## (undated) DEVICE — DAVINCI XI DRAPE ARM 470015

## (undated) DEVICE — SU WND CLOSURE VLOC 180 ABS 0 9" GS-21 VLOCL0346

## (undated) DEVICE — KOH COLPOTOMIZER OCCLUDER  CPO-6

## (undated) DEVICE — ESU GROUND PAD ADULT REM W/15' CORD E7507DB

## (undated) DEVICE — NDL SPINAL 22GA 3.5" QUINCKE 405181

## (undated) DEVICE — SU VICRYL 0 TIE 54" J608H

## (undated) DEVICE — DAVINCI XI NDL DRIVER MEGA SUTURE CUT 8MM 470309

## (undated) DEVICE — DAVINCI XI OBTURATOR BLADELESS 8MM 470359

## (undated) DEVICE — SOL WATER IRRIG 1000ML BOTTLE 2F7114

## (undated) DEVICE — PREP CHLORAPREP 26ML TINTED ORANGE  260815

## (undated) DEVICE — DRAPE SHEET REV FOLD 3/4 9349

## (undated) DEVICE — DAVINCI XI FCP BIPOLAR FENESTRATED 470205

## (undated) DEVICE — SOL ADH LIQUID BENZOIN SWAB 0.6ML C1544

## (undated) DEVICE — SU VICRYL 0 UR-6 27" J603H

## (undated) DEVICE — TUBING SMOKE EVAC .635CMX3M SEA3705

## (undated) DEVICE — DAVINCI XI MONOPOLAR SCISSORS HOT SHEARS 8MM 470179

## (undated) DEVICE — DRAPE MAYO STAND 23X54 8337

## (undated) DEVICE — DAVINCI XI SEAL UNIVERSAL 5-8MM 470361

## (undated) DEVICE — NDL BLUNT 18GA 1" W/O FILTER 305181

## (undated) DEVICE — DEVICE SUTURE GRASPER TROCAR CLOSURE 14GA PMITCSG

## (undated) DEVICE — LINEN TOWEL PACK X6 WHITE 5487

## (undated) DEVICE — SOL NACL 0.9% INJ 1000ML BAG 07983-09

## (undated) DEVICE — JELLY LUBRICATING SURGILUBE 2OZ TUBE

## (undated) DEVICE — SYSTEM CLEARIFY VISUALIZATION 21-345

## (undated) DEVICE — SPONGE LAP 18X18" X8435

## (undated) DEVICE — BAG SPECIMEN NYLON MEMORY WIRE 7.5"X9" SB1079

## (undated) DEVICE — SU VICRYL 4-0 PS-2 18" UND J496H

## (undated) DEVICE — SU VICRYL 0 CT-2 27" J334H

## (undated) DEVICE — SUCTION MANIFOLD DORNOCH ULTRA CART UL-CL500

## (undated) DEVICE — DAVINCI HOT SHEARS TIP COVER  400180

## (undated) DEVICE — PREP TECHNI-CARE CHLOROXYLENOL 3% 4OZ BOTTLE C222-4ZWO

## (undated) DEVICE — BLADE KNIFE SURG 15 371115

## (undated) RX ORDER — KETOROLAC TROMETHAMINE 30 MG/ML
INJECTION, SOLUTION INTRAMUSCULAR; INTRAVENOUS
Status: DISPENSED
Start: 2017-11-29

## (undated) RX ORDER — FENTANYL CITRATE 50 UG/ML
INJECTION, SOLUTION INTRAMUSCULAR; INTRAVENOUS
Status: DISPENSED
Start: 2017-11-29

## (undated) RX ORDER — CEFAZOLIN SODIUM 1 G/50ML
SOLUTION INTRAVENOUS
Status: DISPENSED
Start: 2017-11-29

## (undated) RX ORDER — ONDANSETRON 2 MG/ML
INJECTION INTRAMUSCULAR; INTRAVENOUS
Status: DISPENSED
Start: 2017-11-29

## (undated) RX ORDER — HYDROMORPHONE HCL/0.9% NACL/PF 0.2MG/0.2
SYRINGE (ML) INTRAVENOUS
Status: DISPENSED
Start: 2017-11-29